# Patient Record
Sex: FEMALE | Race: WHITE | Employment: STUDENT | URBAN - METROPOLITAN AREA
[De-identification: names, ages, dates, MRNs, and addresses within clinical notes are randomized per-mention and may not be internally consistent; named-entity substitution may affect disease eponyms.]

---

## 2017-03-02 DIAGNOSIS — E05.00 GRAVES DISEASE: Primary | ICD-10-CM

## 2017-03-03 ENCOUNTER — TELEPHONE (OUTPATIENT)
Dept: ENDOCRINOLOGY | Age: 27
End: 2017-03-03

## 2017-03-03 NOTE — TELEPHONE ENCOUNTER
----- Message from Perley Klinefelter sent at 3/3/2017  8:52 AM EST -----  Spoke to patient this morning and scheduled her for 7/21/17 at 2:50 PM.   Thank you.        ----- Message -----     From: Uriel Strong MD     Sent: 3/2/2017  10:10 PM       To: Perley Klinefelter    Please call her to reschedule for the end of July for thyroid.

## 2017-04-11 LAB
T4 FREE SERPL-MCNC: 0.92 NG/DL (ref 0.82–1.77)
TSH RECEP AB SER-ACNC: 0.83 IU/L (ref 0–1.75)
TSH SERPL DL<=0.005 MIU/L-ACNC: 1.76 UIU/ML (ref 0.45–4.5)

## 2017-07-03 ENCOUNTER — OFFICE VISIT (OUTPATIENT)
Dept: FAMILY MEDICINE CLINIC | Age: 27
End: 2017-07-03

## 2017-07-03 VITALS
TEMPERATURE: 97.9 F | HEIGHT: 60 IN | RESPIRATION RATE: 22 BRPM | WEIGHT: 112.4 LBS | HEART RATE: 91 BPM | OXYGEN SATURATION: 98 % | SYSTOLIC BLOOD PRESSURE: 108 MMHG | BODY MASS INDEX: 22.07 KG/M2 | DIASTOLIC BLOOD PRESSURE: 80 MMHG

## 2017-07-03 DIAGNOSIS — I67.4 HYPERTENSIVE ENCEPHALOPATHY: ICD-10-CM

## 2017-07-03 DIAGNOSIS — N17.9 ACUTE RENAL FAILURE, UNSPECIFIED ACUTE RENAL FAILURE TYPE (HCC): ICD-10-CM

## 2017-07-03 LAB
BILIRUB UR QL STRIP: NEGATIVE
GLUCOSE UR-MCNC: NEGATIVE MG/DL
KETONES P FAST UR STRIP-MCNC: NEGATIVE MG/DL
PH UR STRIP: 7 [PH] (ref 4.6–8)
PROT UR QL STRIP: NEGATIVE MG/DL
SP GR UR STRIP: 1.02 (ref 1–1.03)
UA UROBILINOGEN AMB POC: NORMAL (ref 0.2–1)
URINALYSIS CLARITY POC: CLEAR
URINALYSIS COLOR POC: YELLOW
URINE BLOOD POC: NORMAL
URINE LEUKOCYTES POC: NORMAL
URINE NITRITES POC: NEGATIVE

## 2017-07-03 RX ORDER — NIFEDIPINE 30 MG/1
30 TABLET, FILM COATED, EXTENDED RELEASE ORAL DAILY
Qty: 30 TAB | Refills: 1 | Status: SHIPPED | OUTPATIENT
Start: 2017-07-03 | End: 2017-09-26

## 2017-07-03 RX ORDER — NIFEDIPINE 60 MG/1
TABLET, EXTENDED RELEASE ORAL
Refills: 0 | COMMUNITY
Start: 2017-06-27 | End: 2017-07-03 | Stop reason: DRUGHIGH

## 2017-07-03 NOTE — PROGRESS NOTES
HISTORY OF PRESENT ILLNESS  Taisha Dawn is a 23 y.o. female. f/u hospitalization for preeclampsia,taking nifedipine xl 60. Feeling well,no c/o blood pressure good on home testing  Hypertension    The history is provided by the patient. This is a chronic problem. The problem has not changed since onset. Pertinent negatives include no chest pain, no orthopnea, no palpitations, no malaise/fatigue, no headaches, no neck pain and no peripheral edema. Review of Systems   Constitutional: Negative for fever and malaise/fatigue. Cardiovascular: Negative for chest pain, palpitations and orthopnea. Musculoskeletal: Negative for back pain and neck pain. Neurological: Negative for headaches. Physical Exam   Constitutional: She appears well-developed and well-nourished. HENT:   Head: Normocephalic and atraumatic. Right Ear: External ear normal.   Left Ear: External ear normal.   Nose: Nose normal.   Mouth/Throat: Oropharynx is clear and moist.   Eyes: Conjunctivae are normal. Pupils are equal, round, and reactive to light. Neck: Normal range of motion. Neck supple. Cardiovascular: Normal rate and regular rhythm. Pulmonary/Chest: Effort normal and breath sounds normal.   Abdominal: Soft. Bowel sounds are normal.       ASSESSMENT and PLAN  Wes Galvan was seen today for hypertension. Diagnoses and all orders for this visit:    Postpartum hypertension,resolving. Will reduce nifedipine  -     NIFEdipine ER (ADALAT CC) 30 mg ER tablet; Take 1 Tab by mouth daily. Hypertensive encephalopathy  -     METABOLIC PANEL, COMPREHENSIVE  -     CBC WITH AUTOMATED DIFF  -     AMB POC URINALYSIS DIP STICK AUTO W/O MICRO    Acute renal failure, unspecified acute renal failure type (HCC),await labs  -     METABOLIC PANEL, COMPREHENSIVE  -     CBC WITH AUTOMATED DIFF  -     AMB POC URINALYSIS DIP STICK AUTO W/O MICRO      Follow-up Disposition:  Return in about 4 weeks (around 7/31/2017).

## 2017-07-03 NOTE — MR AVS SNAPSHOT
Visit Information Date & Time Provider Department Dept. Phone Encounter #  
 7/3/2017  3:30 PM [facility] Opitz[facility] [phone] [id number] Follow-up Instructions Return in about 4 weeks (around 7/31/2017). Your Appointments 7/21/2017  2:50 PM  
Follow Up with Mira Frausto MD  
[facility] [address] [facility]) Appt Note: f/u                  thyroid [employer] [address] [id number] [address] [facility] Upcoming Health Maintenance Date Due  
 HPV AGE 9Y-34Y (1 of 3 - Female 3 Dose Series) 9/3/2002 PAP AKA CERVICAL CYTOLOGY 9/3/2012 INFLUENZA AGE 9 TO ADULT 8/1/2017 DTaP/Tdap/Td series (2 - Td) 8/23/2026 Allergies as of 7/3/2017  Review Complete On: 7/3/2017 By: Anabell Strauss Severity Noted Reaction Type Reactions Pcn [Penicillins]  06/07/2014    Unknown (comments) Sulfa (Sulfonamide Antibiotics)  06/07/2014   Side Effect Rash A.s child Current Immunizations  Reviewed on 11/4/2016 Name Date Influenza Vaccine 12/1/2015 Influenza Vaccine (Quad) PF 11/3/2016 Not reviewed this visit You Were Diagnosed With   
  
 Codes Comments Postpartum hypertension    -  Primary ICD-10-CM: O16.5 ICD-9-CM: 642.94 Hypertensive encephalopathy     ICD-10-CM: I67.4 ICD-9-CM: 437.2 Acute renal failure, unspecified acute renal failure type (RUSTca 75.)     ICD-10-CM: N17.9 ICD-9-CM: 810. 9 Vitals BP Pulse Temp Resp Height(growth percentile) Weight(growth percentile) 108/80 (BP 1 Location: Right arm, BP Patient Position: Sitting) 91 97.9 °F (36.6 °C) (Oral) 22 5' (1.524 m) 112 lb 6.4 oz (51 kg) SpO2 BMI OB Status Smoking Status 98% 21.95 kg/m2 Pregnant Never Smoker Vitals History BMI and BSA Data Body Mass Index Body Surface Area 21.95 kg/m 2 1.47 m 2 Preferred Pharmacy Pharmacy Name Phone [facility] [address] [place] [address] [phone] Your Updated Medication List  
  
   
This list is accurate as of: 7/3/17  4:16 PM.  Always use your most recent med list.  
  
  
  
  
 aspirin 81 mg chewable tablet Take 81 mg by mouth daily. DAILY MULTIVITAMIN PO Take  by mouth daily. FIBER SUPPLEMENT PO Take  by mouth.  
  
 lidocaine-aloe vera 0.5 % topical gel Apply  to affected area three (3) times daily as needed. lidocaine-hydrocortisone 3-0.5 % rectal cream  
Apply  to affected area two (2) times a day. methIMAzole 10 mg tablet Commonly known as:  TAPAZOLE Take 0.5 Tabs by mouth three (3) days a week. On Mon, Wed, and Fri--Dose change 3/2/17--updated med list--did not send prescription to the pharmacy  
  
 mupirocin 2 % ointment Commonly known as:  [employer] Apply  to affected area two (2) times a day. NIFEdipine ER 30 mg ER tablet Commonly known as:  ADALAT CC Take 1 Tab by mouth daily. Prescriptions Sent to Pharmacy Refills NIFEdipine ER (ADALAT CC) 30 mg ER tablet 1 Sig: Take 1 Tab by mouth daily. Class: Normal  
 Pharmacy: [facility] [address] [place] [address] Ph #: [phone] Route: Oral  
  
We Performed the Following AMB POC URINALYSIS DIP STICK AUTO W/O MICRO [50479 CPT(R)] CBC WITH AUTOMATED DIFF [68709 CPT(R)] METABOLIC PANEL, COMPREHENSIVE [01962 CPT(R)] Follow-up Instructions Return in about 4 weeks (around 7/31/2017). Introducing [facility] & HEALTH SERVICES! Dear Renee Morillo: Thank you for requesting a [employer] account. Our records indicate that you already have an active [email or web address] account. You can access your account anytime at [email or web address] Did you know that you can access your hospital and ER discharge instructions at any time in [employer]? You can also review all of your test results from your hospital stay or ER visit. Additional Information If you have questions, please visit the Frequently Asked Questions section of the [employer] website at [email or web address] [email or web address]/. Remember, [employer] is NOT to be used for urgent needs. For medical emergencies, dial 911. Now available from your iPhone and Android! Please provide this summary of care documentation to your next provider. Your primary care clinician is listed as Nilo Webb. If you have any questions after today's visit, please call [phone].

## 2017-07-04 LAB
ALBUMIN SERPL-MCNC: 4.6 G/DL (ref 3.5–5.5)
ALBUMIN/GLOB SERPL: 1.8 {RATIO} (ref 1.2–2.2)
ALP SERPL-CCNC: 124 IU/L (ref 39–117)
ALT SERPL-CCNC: 25 IU/L (ref 0–32)
AST SERPL-CCNC: 18 IU/L (ref 0–40)
BASOPHILS # BLD AUTO: 0 X10E3/UL (ref 0–0.2)
BASOPHILS NFR BLD AUTO: 0 %
BILIRUB SERPL-MCNC: 0.3 MG/DL (ref 0–1.2)
BUN SERPL-MCNC: 15 MG/DL (ref 6–20)
BUN/CREAT SERPL: 21 (ref 9–23)
CALCIUM SERPL-MCNC: 9.8 MG/DL (ref 8.7–10.2)
CHLORIDE SERPL-SCNC: 103 MMOL/L (ref 96–106)
CO2 SERPL-SCNC: 24 MMOL/L (ref 18–29)
CREAT SERPL-MCNC: 0.71 MG/DL (ref 0.57–1)
EOSINOPHIL # BLD AUTO: 0.1 X10E3/UL (ref 0–0.4)
EOSINOPHIL NFR BLD AUTO: 2 %
ERYTHROCYTE [DISTWIDTH] IN BLOOD BY AUTOMATED COUNT: 14.2 % (ref 12.3–15.4)
GLOBULIN SER CALC-MCNC: 2.5 G/DL (ref 1.5–4.5)
GLUCOSE SERPL-MCNC: 76 MG/DL (ref 65–99)
HCT VFR BLD AUTO: 37.9 % (ref 34–46.6)
HGB BLD-MCNC: 11.9 G/DL (ref 11.1–15.9)
IMM GRANULOCYTES # BLD: 0 X10E3/UL (ref 0–0.1)
IMM GRANULOCYTES NFR BLD: 0 %
LYMPHOCYTES # BLD AUTO: 2.7 X10E3/UL (ref 0.7–3.1)
LYMPHOCYTES NFR BLD AUTO: 42 %
MCH RBC QN AUTO: 25.5 PG (ref 26.6–33)
MCHC RBC AUTO-ENTMCNC: 31.4 G/DL (ref 31.5–35.7)
MCV RBC AUTO: 81 FL (ref 79–97)
MONOCYTES # BLD AUTO: 0.4 X10E3/UL (ref 0.1–0.9)
MONOCYTES NFR BLD AUTO: 7 %
NEUTROPHILS # BLD AUTO: 3.1 X10E3/UL (ref 1.4–7)
NEUTROPHILS NFR BLD AUTO: 49 %
PLATELET # BLD AUTO: 542 X10E3/UL (ref 150–379)
POTASSIUM SERPL-SCNC: 4.6 MMOL/L (ref 3.5–5.2)
PROT SERPL-MCNC: 7.1 G/DL (ref 6–8.5)
RBC # BLD AUTO: 4.67 X10E6/UL (ref 3.77–5.28)
SODIUM SERPL-SCNC: 144 MMOL/L (ref 134–144)
WBC # BLD AUTO: 6.4 X10E3/UL (ref 3.4–10.8)

## 2017-07-10 DIAGNOSIS — E05.00 GRAVES DISEASE: ICD-10-CM

## 2017-09-23 LAB
T4 FREE SERPL-MCNC: 1.11 NG/DL (ref 0.82–1.77)
TSH RECEP AB SER-ACNC: <0.5 IU/L (ref 0–1.75)
TSH SERPL DL<=0.005 MIU/L-ACNC: 3.76 UIU/ML (ref 0.45–4.5)

## 2017-09-26 ENCOUNTER — OFFICE VISIT (OUTPATIENT)
Dept: ENDOCRINOLOGY | Age: 27
End: 2017-09-26

## 2017-09-26 VITALS
BODY MASS INDEX: 20.65 KG/M2 | SYSTOLIC BLOOD PRESSURE: 105 MMHG | HEART RATE: 86 BPM | DIASTOLIC BLOOD PRESSURE: 77 MMHG | WEIGHT: 105.2 LBS | HEIGHT: 60 IN

## 2017-09-26 DIAGNOSIS — E05.00 GRAVES DISEASE: Primary | ICD-10-CM

## 2017-09-26 RX ORDER — ACETAMINOPHEN AND CODEINE PHOSPHATE 120; 12 MG/5ML; MG/5ML
SOLUTION ORAL
Refills: 0 | COMMUNITY
Start: 2017-07-13 | End: 2018-03-27

## 2017-09-26 RX ORDER — METHIMAZOLE 10 MG/1
5 TABLET ORAL
COMMUNITY
Start: 2017-09-29 | End: 2018-03-27 | Stop reason: SDUPTHER

## 2017-09-26 NOTE — PROGRESS NOTES
Chief Complaint   Patient presents with    Thyroid Problem     pcp and pharmacy confirmed     History of Present Illness: Mary Carlson is a 33 y.o. female here for follow up of thyroid. Weight up 10 lbs since last visit in 10/16. Her son, Greg Tomlinson, was born 6/17/17, and her other son, Sandra Peterson, is now 21 months. Has been taking 1/2 tab of MMI 3x/week since 3/17 and hasn't missed any doses or run out. Currently bottle feeding. No chest pain aside from a rare pain when she breathes in deeply but this is not new. Bowels are somewhat constipated and uses a stool softener. No tremors. Has had some hair loss after delivery. Can get hot at night but not during the day. Currently on micronor and is on her 2nd pack and is only spotting at times. Current Outpatient Prescriptions   Medication Sig    norethindrone (MICRONOR) 0.35 mg tab     methIMAzole (TAPAZOLE) 10 mg tablet Take 0.5 Tabs by mouth three (3) days a week. On Mon, Wed, and Fri--Dose change 3/2/17--updated med list--did not send prescription to the pharmacy    PSYLLIUM SEED, WITH SUGAR, (FIBER SUPPLEMENT PO) Take  by mouth.  MV-MN/FA/VIT K/LYCOP/LUT/COQ10 (DAILY MULTIVITAMIN PO) Take  by mouth daily. No current facility-administered medications for this visit.       Allergies   Allergen Reactions    Pcn [Penicillins] Unknown (comments)    Sulfa (Sulfonamide Antibiotics) Rash     A.s child     Review of Systems:  - Cardiovascular: no chest pain  - Neurological: no tremors  - Integumentary: skin is normal    Physical Examination:  Blood pressure 105/77, pulse 86, height 5' (1.524 m), weight 105 lb 3.2 oz (47.7 kg), currently breastfeeding.  - General: pleasant, no distress, good eye contact   - Neck: small goiter  - Cardiovascular: regular, normal rate, nl s1 and s2, no m/r/g   - Respiratory: clear to auscultation bilaterally   - Integumentary: skin is normal, no edema  - Neurological: reflexes 2+ at biceps, no tremors  - Psychiatric: normal mood and affect    Data Reviewed:   Component      Latest Ref Rng & Units 9/22/2017 9/22/2017 9/22/2017          12:00 PM 12:00 PM 12:00 PM   T4, Free      0.82 - 1.77 ng/dL   1.11   TSH      0.450 - 4.500 uIU/mL  3.760    Thyrotropin Receptor Ab, serum      0.00 - 1.75 IU/L <0.50         Assessment/Plan:     1. Grave's disease: She has always been told she has normal thyroid function and delivered her son, Lamin Cuellar, on 12/21/15 and after delivery developed hypertension and needed metoprolol. Had her thyroid levels checked after delivery and TSH was normal but saw Dr. Amisha Rincon on 5/18/16 and TSH was low at 0.012 and T4 was high at 13.3. Repeat labs with me in 5/16 showed TSH 0.009, FT4 3.27 and T3 263 and TRAb of 1.97 and TPO ab of 43 and I started her on MMI 30 mg daily. TSH up to 0.013 and FT4/T3 normal in 6/16 so decreased to 20 mg daily and TSH up to 8.93 in 7/16 so decreased to 10 mg daily and TSH up to 33 in 8/16 so decreased to 5 mg daily. TSH normal at 0.61 and FT4 1.34 and T3 151 in 10/16 so kept her dose the same. Then found out she was pregnant and TSH 3.47 in 11/16 and changed to PTU 50 mg 1 tab in am and 1/2 tab in pm during 1st trimester and then changed back to MMI 10 mg 1/2 tab daily when she was in 2nd trimester. TSH 4.73 and TRAb 2.75 and TPO ab 29 in 1/17 and decreased to 1/2 tab 4x/week,  TSH 2.32 in 3/17 and decreased to 1/2 tab 3x/week and TSH 1.76 and TRAb 0.83 in 4/17 and kept her dose the same through delivery in 6/17. TSH 3.76 and TRAb < 0.50 in 9/17 so will decrease to 1/2 tab 2x/week for 3 months. - cont methimazole 1/2 of 10 mg daily  - check TSH and free T4 in 3 months and prior to next visit  - check TSH receptor antibody prior to next visit          Patient Instructions   1) Your TSH is a thyroid test.  Your level is 3.7 which is normal but slightly above goal of 0.5-2.0. This test goes opposite of your thyroid dose and suggests your dose of methimazole is more than you need.   I will decrease your dose to 1/2 tab 2x/week on Mon and Fri. I updated your med list but did not send a new prescription to your local pharmacy. 2) Your TSH receptor antibody, which is a test for Grave's disease, is now undetectable so you may go into remission in the future which would allow us to stop the methimazole. 3) Repeat your labs in 3 months and then again prior to your visit in 6 months. 4) I will send you a reminder e-mail in 3 months and 3-4 weeks prior to your next visit and you will have the order already in the [employer] system so you can just go sometime in the 3-7 days before the next visit to have your labs drawn. Follow-up Disposition:  Return in about 6 months (around 3/26/2018). Copy sent to:  Dr. Shyanne Gould via [facility]  Dr. Martha Freedman follow up: 12/24/17  Component      Latest Ref Rng & Units 12/21/2017 12/21/2017          11:52 AM 11:52 AM   T4, Free      0.82 - 1.77 ng/dL  1.06   TSH      0.450 - 4.500 uIU/mL 3.350      TSH is a thyroid test.  Your level is 3.35 which is normal but slightly above goal of 0.5-2.0. This test goes opposite of your thyroid dose and suggests your dose of methimazole is still working well and I would like you to decrease your dose to 1/2 tab once a week on Mondays only until you come back in March. If your level is still normal at that time and your antibody level remains undetectable, we will try stopping the methimazole at that time.

## 2017-09-26 NOTE — PATIENT INSTRUCTIONS
1) Your TSH is a thyroid test.  Your level is 3.7 which is normal but slightly above goal of 0.5-2.0. This test goes opposite of your thyroid dose and suggests your dose of methimazole is more than you need. I will decrease your dose to 1/2 tab 2x/week on Mon and Fri. I updated your med list but did not send a new prescription to your local pharmacy. 2) Your TSH receptor antibody, which is a test for Grave's disease, is now undetectable so you may go into remission in the future which would allow us to stop the methimazole. 3) Repeat your labs in 3 months and then again prior to your visit in 6 months. 4) I will send you a reminder e-mail in 3 months and 3-4 weeks prior to your next visit and you will have the order already in the [employer] system so you can just go sometime in the 3-7 days before the next visit to have your labs drawn.

## 2017-09-26 NOTE — MR AVS SNAPSHOT
Visit Information Date & Time Provider Department Dept. Phone Encounter #  
 9/26/2017  9:30 AM Alisha Killian, [address] [place] [address] [facility] [phone] [id number] Follow-up Instructions Return in about 6 months (around 3/26/2018). Upcoming Health Maintenance Date Due  
 HPV AGE 9Y-34Y (1 of 3 - Female 3 Dose Series) 9/3/2002 Pneumococcal 19-64 Highest Risk (1 of 3 - PCV13) 9/3/2010 PAP AKA CERVICAL CYTOLOGY 9/3/2012 INFLUENZA AGE 9 TO ADULT 8/1/2017 DTaP/Tdap/Td series (2 - Td) 8/23/2026 Allergies as of 9/26/2017  Review Complete On: 9/26/2017 By: Alisha Killian MD  
  
 Severity Noted Reaction Type Reactions Pcn [Penicillins]  06/07/2014    Unknown (comments) Sulfa (Sulfonamide Antibiotics)  06/07/2014   Side Effect Rash A.s child Current Immunizations  Reviewed on 11/4/2016 Name Date Influenza Vaccine 12/1/2015 Influenza Vaccine (Quad) PF 11/3/2016 Not reviewed this visit You Were Diagnosed With   
  
 Codes Comments Graves disease    -  Primary ICD-10-CM: E05.00 ICD-9-CM: 242.00 Vitals BP Pulse Height(growth percentile) Weight(growth percentile) BMI OB Status 105/77 86 5' (1.524 m) 105 lb 3.2 oz (47.7 kg) 20.55 kg/m2 Pregnant Smoking Status Never Smoker Vitals History BMI and BSA Data Body Mass Index Body Surface Area 20.55 kg/m 2 1.42 m 2 Preferred Pharmacy Pharmacy Name Phone [facility] [address] [address] [employer] [facility] [phone] Your Updated Medication List  
  
   
This list is accurate as of: 9/26/17 10:20 AM.  Always use your most recent med list.  
  
  
  
  
 DAILY MULTIVITAMIN PO Take  by mouth daily. FIBER SUPPLEMENT PO Take  by mouth. methIMAzole 10 mg tablet Commonly known as:  TAPAZOLE  
 Take 0.5 Tabs by mouth two (2) times a week. On Mon and Fri--Dose change 9/26/17--updated med list--did not send prescription to the pharmacy Start taking on:  9/29/2017  
  
 norethindrone 0.35 mg Tab Commonly known as:  [employer] Regulo Follow-up Instructions Return in about 6 months (around 3/26/2018). Patient Instructions 1) Your TSH is a thyroid test.  Your level is 3.7 which is normal but slightly above goal of 0.5-2.0. This test goes opposite of your thyroid dose and suggests your dose of methimazole is more than you need. I will decrease your dose to 1/2 tab 2x/week on Mon and Fri. I updated your med list but did not send a new prescription to your local pharmacy. 2) Your TSH receptor antibody, which is a test for Grave's disease, is now undetectable so you may go into remission in the future which would allow us to stop the methimazole. 3) Repeat your labs in 3 months and then again prior to your visit in 6 months. 4) I will send you a reminder e-mail in 3 months and 3-4 weeks prior to your next visit and you will have the order already in the [facility] system so you can just go sometime in the 3-7 days before the next visit to have your labs drawn. Introducing [facility] & HEALTH SERVICES! Dear Dianne Hinton: Thank you for requesting a [employer] account. Our records indicate that you already have an active [employer] account. You can access your account anytime at [email or web address] Did you know that you can access your hospital and ER discharge instructions at any time in [employer]? You can also review all of your test results from your hospital stay or ER visit. Additional Information If you have questions, please visit the Frequently Asked Questions section of the [employer] website at [email or web address]/. Remember, [employer] is NOT to be used for urgent needs. For medical emergencies, dial 911. Now available from your iPhone and Android! Please provide this summary of care documentation to your next provider. Your primary care clinician is listed as Nilo Webb. If you have any questions after today's visit, please call [phone].

## 2017-12-20 DIAGNOSIS — E05.00 GRAVES DISEASE: ICD-10-CM

## 2017-12-22 LAB
T4 FREE SERPL-MCNC: 1.06 NG/DL (ref 0.82–1.77)
TSH SERPL DL<=0.005 MIU/L-ACNC: 3.35 UIU/ML (ref 0.45–4.5)

## 2017-12-24 DIAGNOSIS — E05.00 GRAVES DISEASE: ICD-10-CM

## 2018-03-24 LAB
T4 FREE SERPL-MCNC: 1.16 NG/DL (ref 0.82–1.77)
TSH RECEP AB SER-ACNC: 1.99 IU/L (ref 0–1.75)
TSH SERPL DL<=0.005 MIU/L-ACNC: 0.44 UIU/ML (ref 0.45–4.5)

## 2018-03-27 ENCOUNTER — OFFICE VISIT (OUTPATIENT)
Dept: ENDOCRINOLOGY | Age: 28
End: 2018-03-27

## 2018-03-27 VITALS
DIASTOLIC BLOOD PRESSURE: 65 MMHG | BODY MASS INDEX: 20.14 KG/M2 | HEART RATE: 75 BPM | HEIGHT: 60 IN | SYSTOLIC BLOOD PRESSURE: 118 MMHG | WEIGHT: 102.6 LBS

## 2018-03-27 DIAGNOSIS — E05.00 GRAVES DISEASE: Primary | ICD-10-CM

## 2018-03-27 RX ORDER — METHIMAZOLE 10 MG/1
TABLET ORAL
COMMUNITY
Start: 2018-03-27 | End: 2018-06-21 | Stop reason: SDUPTHER

## 2018-03-27 NOTE — MR AVS SNAPSHOT
[address] [facility] [address] [address] [phone] Patient: Gena Brown MRN:  [id number] Visit Information Date & Time Provider Department Dept. Phone Encounter #  
 3/27/2018  9:50 AM Lolly Mak MD [facility] [phone] [id number] Follow-up Instructions Return in about 6 months (around 9/27/2018). Upcoming Health Maintenance Date Due  
 HPV AGE 9Y-34Y (1 of 3 - Female 3 Dose Series) 9/3/2002 Pneumococcal 19-64 Highest Risk (1 of 3 - PCV13) 9/3/2010 PAP AKA CERVICAL CYTOLOGY 9/3/2012 Influenza Age 5 to Adult 8/1/2017 DTaP/Tdap/Td series (2 - Td) 8/23/2026 Allergies as of 3/27/2018  Review Complete On: 3/27/2018 By: Lolly Mak MD  
  
 Severity Noted Reaction Type Reactions Pcn [Penicillins]  06/07/2014    Unknown (comments) Sulfa (Sulfonamide Antibiotics)  06/07/2014   Side Effect Rash A.s child Current Immunizations  Reviewed on 11/4/2016 Name Date Influenza Vaccine 12/1/2015 Influenza Vaccine (Quad) PF 11/3/2016 Not reviewed this visit You Were Diagnosed With   
  
 Codes Comments Graves disease    -  Primary ICD-10-CM: E05.00 ICD-9-CM: 242.00 Vitals BP Pulse Height(growth percentile) Weight(growth percentile) BMI OB Status 118/65 75 5' (1.524 m) 102 lb 9.6 oz (46.5 kg) 20.04 kg/m2 Pregnant Smoking Status Never Smoker Vitals History BMI and BSA Data Body Mass Index Body Surface Area 20.04 kg/m 2 1.4 m 2 Preferred Pharmacy Pharmacy Name Phone [facility] [address] [place] [address], [facility] [address] [phone] Your Updated Medication List  
  
   
This list is accurate as of 3/27/18 10:27 AM.  Always use your most recent med list.  
  
  
  
  
 FIBER SUPPLEMENT PO Take  by mouth. levonorgestrel 20 mcg/24 hr (5 years) Iud  
Commonly known as:  MIRENA  
1 Device by IntraUTERine route once. Inserted in 12/17  
  
 methIMAzole 10 mg tablet Commonly known as:  TAPAZOLE Take 1/2 tab on Mon, Wed, and Fri--Dose change 3/27/18--updated med list--did not send prescription to the pharmacy PROBIOTIC PO Take  by mouth. Follow-up Instructions Return in about 6 months (around 9/27/2018). To-Do List   
 06/27/2018 Lab:  T4, FREE   
  
 06/27/2018 Lab:  TSH 3RD GENERATION   
  
 09/10/2018 Lab:  T4, FREE   
  
 09/10/2018 Lab:  TSH 3RD GENERATION   
  
 09/10/2018 Lab:  TSH RECEPTOR AB Patient Instructions 1) TSH is a thyroid test.  Your level is 0.43 which is slightly low and below goal of 0.5-2.0. This test goes opposite of your thyroid dose and suggests your dose of methimazole is not enough and your TSH receptor antibody, which is a test for Grave's disease is higher than in 9/17. I will increase your dose to 1/2 tab 3x/week. I updated your med list but did not send a new prescription to your local pharmacy. 2) Plan on repeating your labs in 3 months and in 6 months and I sent you reminders for both of these. Introducing [facility] & HEALTH SERVICES! Dear Manisha Marlow: Thank you for requesting a [facility] account. Our records indicate that you already have an active [employer] account. You can access your account anytime at [email or web address] Did you know that you can access your hospital and ER discharge instructions at any time in [employer]? You can also review all of your test results from your hospital stay or ER visit. Additional Information If you have questions, please visit the Frequently Asked Questions section of the [employer] website at [email or web address] Remember, [facility] [facility] [employer] is NOT to be used for urgent needs. For medical emergencies, dial 911. Now available from your iPhone and Android! Please provide this summary of care documentation to your next provider. Your primary care clinician is listed as Darin Carolina. If you have any questions after today's visit, please call [phone].

## 2018-03-27 NOTE — PROGRESS NOTES
Chief Complaint   Patient presents with    Thyroid Problem     pcp and pharmacy confirmed     History of Present Illness: Freddy Mcgowan is a 33 y.o. female here for follow up of thyroid. Weight down 3 lbs since last visit in 9/17. Has been taking 1/2 tab of MMI every Monday and if she misses will take on Tuesday and hasn't missed any weeks at all. Has started feeling hotter more recently over the past 3 weeks. Her hair loss has slowed. No chest pain. Some palpitations that are unchanged. No tremors. Bowels tend to be on the constipated side. Some anxiety and trouble sleeping that has increased. Her one son is now 6 months and the other is 3years old. Does have some dizziness. Did change from OCP to IUD in 12/17. Current Outpatient Prescriptions   Medication Sig    LACTOBACILLUS ACIDOPHILUS (PROBIOTIC PO) Take  by mouth.  methIMAzole (TAPAZOLE) 10 mg tablet Take 1/2 tab weekly    levonorgestrel (MIRENA) 20 mcg/24 hr (5 years) IUD 1 Device by IntraUTERine route once. Inserted in 12/17    PSYLLIUM SEED, WITH SUGAR, (FIBER SUPPLEMENT PO) Take  by mouth. No current facility-administered medications for this visit.       Allergies   Allergen Reactions    Pcn [Penicillins] Unknown (comments)    Sulfa (Sulfonamide Antibiotics) Rash     A.s child     Review of Systems:  - Cardiovascular: no chest pain  - Neurological: no tremors  - Integumentary: skin is normal    Physical Examination:  Blood pressure 118/65, pulse 75, height 5' (1.524 m), weight 102 lb 9.6 oz (46.5 kg), currently breastfeeding.  - General: pleasant, no distress, good eye contact   - Neck: small goiter, no thyroid bruits  - Cardiovascular: regular, normal rate, nl s1 and s2, no m/r/g   - Respiratory: clear to auscultation bilaterally   - Integumentary: skin is normal, no edema  - Neurological: reflexes 2+ at biceps, mild tremor  - Psychiatric: normal mood and affect    Data Reviewed:   Component      Latest Ref Rng & Units 3/23/2018 3/23/2018 3/23/2018          12:07 PM 12:07 PM 12:07 PM   Thyrotropin Receptor Ab, serum      0.00 - 1.75 IU/L   1.99 (H)   T4, Free      0.82 - 1.77 ng/dL  1.16    TSH      0.450 - 4.500 uIU/mL 0.439 (L)         Assessment/Plan:     1. Grave's disease: She has always been told she has normal thyroid function and delivered her son, Belgica Louise, on 12/21/15 and after delivery developed hypertension and needed metoprolol. Had her thyroid levels checked after delivery and TSH was normal but saw Dr. Joann Heredia on 5/18/16 and TSH was low at 0.012 and T4 was high at 13.3. Repeat labs with me in 5/16 showed TSH 0.009, FT4 3.27 and T3 263 and TRAb of 1.97 and TPO ab of 43 and I started her on MMI 30 mg daily. TSH up to 0.013 and FT4/T3 normal in 6/16 so decreased to 20 mg daily and TSH up to 8.93 in 7/16 so decreased to 10 mg daily and TSH up to 33 in 8/16 so decreased to 5 mg daily. TSH normal at 0.61 and FT4 1.34 and T3 151 in 10/16 so kept her dose the same. Then found out she was pregnant and TSH 3.47 in 11/16 and changed to PTU 50 mg 1 tab in am and 1/2 tab in pm during 1st trimester and then changed back to MMI 10 mg 1/2 tab daily when she was in 2nd trimester. TSH 4.73 and TRAb 2.75 and TPO ab 29 in 1/17 and decreased to 1/2 tab 4x/week,  TSH 2.32 in 3/17 and decreased to 1/2 tab 3x/week and TSH 1.76 and TRAb 0.83 in 4/17 and kept her dose the same through delivery in 6/17. TSH 3.76 and TRAb < 0.50 in 9/17 so decreased to 1/2 tab 2x/week for 3 months and TSH 3.35 in 12/17 so decreased to 1/2 tab once a week and TSH down to 0.439 and TRAb up to 1.99 in 3/18 so will go back to 1/2 tab 3x/week  - increase methimazole to 1/2 of 10 mg 3x/week  - check TSH and free T4 in 3 months and prior to next visit  - check TSH receptor antibody prior to next visit          Patient Instructions   1) TSH is a thyroid test.  Your level is 0.43 which is slightly low and below goal of 0.5-2.0.   This test goes opposite of your thyroid dose and suggests your dose of methimazole is not enough and your TSH receptor antibody, which is a test for Grave's disease is higher than in 9/17. I will increase your dose to 1/2 tab 3x/week. I updated your med list but did not send a new prescription to your local pharmacy. 2) Plan on repeating your labs in 3 months and in 6 months and I sent you reminders for both of these. Follow-up Disposition:  Return in about 6 months (around 9/27/2018). Copy sent to:  Dr. Pierre Lloyd via [facility]  Dr. Argenis Gary    Lab follow up: 6/21/18  - TSH 3.88    Sent her the following message through [employer]:  TSH is a thyroid test.  Your level is 3.88 which is normal but above goal of 0.5-2.0. This test goes opposite of your thyroid dose and suggests your dose of methimazole is now more than you need. I will decrease your dose back to 1/2 of the 10 mg tab 2x/week on Mon and Fri until you come back to see me in September.

## 2018-03-27 NOTE — PATIENT INSTRUCTIONS
1) TSH is a thyroid test.  Your level is 0.43 which is slightly low and below goal of 0.5-2.0. This test goes opposite of your thyroid dose and suggests your dose of methimazole is not enough and your TSH receptor antibody, which is a test for Grave's disease is higher than in 9/17. I will increase your dose to 1/2 tab 3x/week. I updated your med list but did not send a new prescription to your local pharmacy. 2) Plan on repeating your labs in 3 months and in 6 months and I sent you reminders for both of these.

## 2018-06-21 RX ORDER — METHIMAZOLE 10 MG/1
TABLET ORAL
COMMUNITY
Start: 2018-06-21 | End: 2018-09-18 | Stop reason: SDUPTHER

## 2018-07-11 ENCOUNTER — OFFICE VISIT (OUTPATIENT)
Dept: FAMILY MEDICINE CLINIC | Age: 28
End: 2018-07-11

## 2018-07-11 VITALS
TEMPERATURE: 99.3 F | OXYGEN SATURATION: 100 % | DIASTOLIC BLOOD PRESSURE: 68 MMHG | SYSTOLIC BLOOD PRESSURE: 110 MMHG | HEIGHT: 60 IN | BODY MASS INDEX: 19.83 KG/M2 | RESPIRATION RATE: 24 BRPM | HEART RATE: 119 BPM | WEIGHT: 101 LBS

## 2018-07-11 DIAGNOSIS — J02.9 PHARYNGITIS, UNSPECIFIED ETIOLOGY: Primary | ICD-10-CM

## 2018-07-11 DIAGNOSIS — R50.9 FEVER AND CHILLS: ICD-10-CM

## 2018-07-11 DIAGNOSIS — J32.0 MAXILLARY SINUSITIS, UNSPECIFIED CHRONICITY: Primary | ICD-10-CM

## 2018-07-11 DIAGNOSIS — F41.1 GENERALIZED ANXIETY DISORDER: ICD-10-CM

## 2018-07-11 LAB
BILIRUB UR QL STRIP: NEGATIVE
GLUCOSE UR-MCNC: NEGATIVE MG/DL
KETONES P FAST UR STRIP-MCNC: NORMAL MG/DL
PH UR STRIP: 7 [PH] (ref 4.6–8)
PROT UR QL STRIP: NORMAL
QUICKVUE INFLUENZA TEST: NEGATIVE
S PYO AG THROAT QL: NEGATIVE
SP GR UR STRIP: 1.02 (ref 1–1.03)
UA UROBILINOGEN AMB POC: NORMAL (ref 0.2–1)
URINALYSIS CLARITY POC: CLEAR
URINALYSIS COLOR POC: YELLOW
URINE BLOOD POC: NEGATIVE
URINE LEUKOCYTES POC: NEGATIVE
URINE NITRITES POC: NEGATIVE
VALID INTERNAL CONTROL?: YES
VALID INTERNAL CONTROL?: YES

## 2018-07-11 RX ORDER — ALPRAZOLAM 0.25 MG/1
0.25 TABLET ORAL
Qty: 30 TAB | Refills: 2 | Status: SHIPPED | OUTPATIENT
Start: 2018-07-11 | End: 2018-09-24

## 2018-07-11 RX ORDER — AZITHROMYCIN 250 MG/1
TABLET, FILM COATED ORAL
Qty: 6 TAB | Refills: 0 | Status: SHIPPED | OUTPATIENT
Start: 2018-07-11 | End: 2018-09-24

## 2018-07-11 RX ORDER — ESCITALOPRAM OXALATE 5 MG/1
5 TABLET ORAL DAILY
Qty: 30 TAB | Refills: 1 | Status: SHIPPED | OUTPATIENT
Start: 2018-07-11 | End: 2018-09-24

## 2018-07-11 NOTE — PROGRESS NOTES
Chief Complaint   Patient presents with    Muscle Pain     Pt state she is having muscle pains.  Fever     Pt has low grade fever.

## 2018-07-11 NOTE — MR AVS SNAPSHOT
[place] 
 
 
 [address] [facility] [address] [address] 
[phone] Patient: Phan Dumont MRN: [id number] Visit Information Date & Time Provider Department Dept. Phone Encounter #  
 7/11/2018  4:15 PM Tung Hopkins Shyam Corcoran [phone] [id number] Follow-up Instructions Return if symptoms worsen or fail to improve. Your Appointments 9/24/2018 10:30 AM  
Follow Up with MD Jamar Hussein Diabetes and Endocrinology [facility]) Appt Note: f/u Thyroid [address] [phone] [address] [facility] Upcoming Health Maintenance Date Due  
 HPV Age 9Y-34Y (3 of 3 - Female 3 Dose Series) 9/3/2002 PAP AKA CERVICAL CYTOLOGY 9/3/2012 Influenza Age 5 to Adult 8/1/2018 DTaP/Tdap/Td series (2 - Td) 8/23/2026 Allergies as of 7/11/2018  Review Complete On: 7/11/2018 By: Tung Hopkins MD  
  
 Severity Noted Reaction Type Reactions Pcn [Penicillins]  06/07/2014    Unknown (comments) Sulfa (Sulfonamide Antibiotics)  06/07/2014   Side Effect Rash A.s child Current Immunizations  Reviewed on 11/4/2016 Name Date Influenza Vaccine 12/1/2015 Influenza Vaccine (Quad) PF 11/3/2016 Not reviewed this visit You Were Diagnosed With   
  
 Codes Comments Pharyngitis, unspecified etiology    -  Primary ICD-10-CM: J02.9 ICD-9-CM: 435 Fever and chills     ICD-10-CM: R50.9 ICD-9-CM: 780.60 Generalized anxiety disorder     ICD-10-CM: F41.1 ICD-9-CM: 300.02 Vitals BP Pulse Temp Resp Height(growth percentile) Weight(growth percentile) 110/68 (BP 1 Location: Left arm, BP Patient Position: Sitting) (!) 119 99.3 °F (37.4 °C) (Oral) 24 5' (1.524 m) 101 lb (45.8 kg) SpO2 BMI OB Status Smoking Status 100% 19.73 kg/m2 Pregnant Never Smoker Vitals History BMI and BSA Data Body Mass Index Body Surface Area  
 19.73 kg/m 2 1.39 m 2 Preferred Pharmacy Pharmacy Name Phone [facility] [address], [address] [place] [address] Drive [phone] Your Updated Medication List  
  
   
This list is accurate as of 7/11/18  5:02 PM.  Always use your most recent med list.  
  
  
  
  
 ALPRAZolam 0.25 mg tablet Commonly known as:  Rey [place] Take 1 Tab by mouth nightly as needed for Anxiety. Max Daily Amount: 0.25 mg.  
  
 azithromycin 250 mg tablet Commonly known as:  Toshia Brain Take 2 tablets today, then take 1 tablet daily  
  
 escitalopram oxalate 5 mg tablet Commonly known as:  Abelardo Leriche Take 1 Tab by mouth daily. FIBER SUPPLEMENT PO Take  by mouth.  
  
 levonorgestrel 20 mcg/24 hr (5 years) Iud  
Commonly known as:  MIRENA  
1 Device by IntraUTERine route once. Inserted in 12/17  
  
 methIMAzole 10 mg tablet Commonly known as:  TAPAZOLE Take 1/2 tab on Mon, and Fri--Dose change 6/21/18--updated med list--did not send prescription to the pharmacy PROBIOTIC PO Take  by mouth. Prescriptions Printed Refills  
 escitalopram oxalate (LEXAPRO) 5 mg tablet 1 Sig: Take 1 Tab by mouth daily. Class: Print Route: Oral  
 ALPRAZolam (XANAX) 0.25 mg tablet 2 Sig: Take 1 Tab by mouth nightly as needed for Anxiety. Max Daily Amount: 0.25 mg.  
 Class: Print Route: Oral  
  
We Performed the Following AMB POC COMPLETE CBC, AUTOMATED [11430 CPT(R)] AMB POC RAPID INFLUENZA TEST [02232 CPT(R)] AMB POC RAPID STREP A [23585 CPT(R)] AMB POC URINALYSIS DIP STICK AUTO W/O MICRO [67672 CPT(R)] Follow-up Instructions Return if symptoms worsen or fail to improve. Introducing [address]! Dear Kait Mckenzie: Thank you for requesting a [employer] account. Our records indicate that you already have an active [employer] account. You can access your account anytime at [email or web address] Did you know that you can access your hospital and ER discharge instructions at any time in [employer]? You can also review all of your test results from your hospital stay or ER visit. Additional Information If you have questions, please visit the Frequently Asked Questions section of the [employer] website at [email or web address] Remember, [employer] is NOT to be used for urgent needs. For medical emergencies, dial 911. Now available from your iPhone and Android! Please provide this summary of care documentation to your next provider. Your primary care clinician is listed as Zoran Dupont. If you have any questions after today's visit, please call [phone].

## 2018-07-11 NOTE — PROGRESS NOTES
HISTORY OF PRESENT ILLNESS  Talita Cuenca is a 33 y.o. female. 2 days sore throat,fever and chills ,myalgias. No cough ,dysuria,dirrhea or sick contacts  Muscle Pain    The history is provided by the patient. This is a new problem. The problem occurs daily. The quality of the pain is described as aching. The pain is at a severity of 4/10. The pain is moderate. Associated symptoms include back pain and neck pain. Fever    This is a chronic problem. The problem occurs daily. The problem has not changed since onset. Associated symptoms include headaches, sore throat, muscle aches and neck pain. Pertinent negatives include no chest pain, no diarrhea, no congestion, no cough, no mental status change and no rash. Anxiety   This is a chronic problem. The problem occurs daily. The problem has not changed since onset. Associated symptoms include headaches. Pertinent negatives include no chest pain. Review of Systems   Constitutional: Positive for fever. HENT: Positive for sore throat. Negative for congestion and hearing loss. Respiratory: Negative for cough. Cardiovascular: Negative for chest pain. Gastrointestinal: Negative for diarrhea. Musculoskeletal: Positive for back pain, myalgias and neck pain. Skin: Negative for rash. Neurological: Positive for headaches. Physical Exam   Constitutional: She is oriented to person, place, and time. She appears well-developed and well-nourished. HENT:   Head: Normocephalic and atraumatic. Right Ear: Tympanic membrane and ear canal normal.   Left Ear: Tympanic membrane and ear canal normal.   Nose: Mucosal edema and rhinorrhea present. Mouth/Throat: Posterior oropharyngeal erythema present. Eyes: Conjunctivae are normal. Pupils are equal, round, and reactive to light. Neck: Normal range of motion. Neck supple. Abdominal: Soft. Bowel sounds are normal.   Neurological: She is alert and oriented to person, place, and time. Skin: Skin is warm. Psychiatric: She has a normal mood and affect. ASSESSMENT and PLAN  Diagnoses and all orders for this visit:    1. Pharyngitis, unspecified etiology  -     AMB POC RAPID STREP A    2. Fever and chills  -     AMB POC URINALYSIS DIP STICK AUTO W/O MICRO  -     AMB POC COMPLETE CBC, AUTOMATED  -     AMB POC RAPID INFLUENZA TEST    3. Generalized anxiety disorder  -     escitalopram oxalate (LEXAPRO) 5 mg tablet; Take 1 Tab by mouth daily.  -     ALPRAZolam (XANAX) 0.25 mg tablet; Take 1 Tab by mouth nightly as needed for Anxiety. Max Daily Amount: 0.25 mg. Follow-up Disposition:  Return if symptoms worsen or fail to improve.

## 2018-09-18 LAB
T4 FREE SERPL-MCNC: 1.04 NG/DL (ref 0.82–1.77)
TSH SERPL DL<=0.005 MIU/L-ACNC: 5.88 UIU/ML (ref 0.45–4.5)

## 2018-09-18 RX ORDER — METHIMAZOLE 10 MG/1
TABLET ORAL
COMMUNITY
Start: 2018-09-18 | End: 2019-03-25

## 2018-09-24 ENCOUNTER — OFFICE VISIT (OUTPATIENT)
Dept: ENDOCRINOLOGY | Age: 28
End: 2018-09-24

## 2018-09-24 VITALS
SYSTOLIC BLOOD PRESSURE: 110 MMHG | DIASTOLIC BLOOD PRESSURE: 73 MMHG | HEART RATE: 83 BPM | BODY MASS INDEX: 19.99 KG/M2 | HEIGHT: 60 IN | WEIGHT: 101.8 LBS

## 2018-09-24 DIAGNOSIS — E05.00 GRAVES DISEASE: Primary | ICD-10-CM

## 2018-09-24 NOTE — MR AVS SNAPSHOT
[address] [facility] [address] [address] [phone] Patient: Kenya Iraheta MRN:  [id number] Visit Information Date & Time Provider Department Dept. Phone Encounter #  
 9/24/2018 10:30 AM Janusz Alba, [address] [place] [address] Diabetes and Endocrinology [phone] [id number] Follow-up Instructions Return in about 6 months (around 3/24/2019). Upcoming Health Maintenance Date Due  
 PAP AKA CERVICAL CYTOLOGY 9/3/2012 Influenza Age 5 to Adult 8/1/2018 DTaP/Tdap/Td series (2 - Td) 8/23/2026 Allergies as of 9/24/2018  Review Complete On: 9/24/2018 By: Janusz Alba MD  
  
 Severity Noted Reaction Type Reactions Pcn [Penicillins]  06/07/2014    Unknown (comments) Sulfa (Sulfonamide Antibiotics)  06/07/2014   Side Effect Rash A.s child Current Immunizations  Reviewed on 11/4/2016 Name Date Influenza Vaccine 12/1/2015 Influenza Vaccine (Quad) PF 11/3/2016 Not reviewed this visit You Were Diagnosed With   
  
 Codes Comments Graves disease    -  Primary ICD-10-CM: E05.00 ICD-9-CM: 242.00 Vitals BP Pulse Height(growth percentile) Weight(growth percentile) BMI OB Status 110/73 83 5' (1.524 m) 101 lb 12.8 oz (46.2 kg) 19.88 kg/m2 Pregnant Smoking Status Never Smoker Vitals History BMI and BSA Data Body Mass Index Body Surface Area  
 19.88 kg/m 2 1.4 m 2 Preferred Pharmacy Pharmacy Name Phone [facility] [address] [place] [address] [facility] [phone] Your Updated Medication List  
  
   
This list is accurate as of 9/24/18 10:49 AM.  Always use your most recent med list.  
  
  
  
  
 FIBER SUPPLEMENT PO Take  by mouth. KYLEENA 17.5 mcg/24 hr (5 years) Iud IUD Generic drug:  levonorgestrel 1 Each by IntraUTERine route once. methIMAzole 10 mg tablet Commonly known as:  TAPAZOLE Take 1/2 tab on Mon only--Dose change 9/18/18--updated med list--did not send prescription to the pharmacy PROBIOTIC PO Take  by mouth. Follow-up Instructions Return in about 6 months (around 3/24/2019). To-Do List   
 12/24/2018 Lab:  T4, FREE   
  
 12/24/2018 Lab:  TSH 3RD GENERATION   
  
 12/24/2018 Lab:  TSH RECEPTOR AB   
  
 03/14/2019 Lab:  T4, FREE   
  
 03/14/2019 Lab:  TSH 3RD GENERATION   
  
 03/14/2019 Lab:  TSH RECEPTOR AB Patient Instructions 1) Stay on methimazole 1/2 of 10 mg tab once a week for the next 3 months and then we'll repeat your labs Introducing [facility]! Dear Ariana Rooney: Thank you for requesting a [employer] account. Our records indicate that you already have an active [employer] account. You can access your account anytime at [email or web address] Did you know that you can access your hospital and ER discharge instructions at any time in [employer]? You can also review all of your test results from your hospital stay or ER visit. Additional Information If you have questions, please visit the Frequently Asked Questions section of the [employer] website at [email or web address] Remember, [employer] is NOT to be used for urgent needs. For medical emergencies, dial 911. Now available from your iPhone and Android! Please provide this summary of care documentation to your next provider. Your primary care clinician is listed as Kanu Sewell. If you have any questions after today's visit, please call [phone].

## 2018-09-24 NOTE — PROGRESS NOTES
Chief Complaint   Patient presents with    Thyroid Problem     pcp and pharmacy confirmed     History of Present Illness: [place] is a 33 y.o. female here for follow up of thyroid. Weight down 1 lbs since last visit in 3/18. Had been taking 1/2 tab 2x/week of 10 mg of MMI until I e-mailed her last week to decrease to 1/2 tab once a week. No chest pain. Rare palpitations. Has felt hotter more and waking up at night sweating. Still doesn't sleep well. Bowels are regular. Has had a lot of hair loss. No tremors. Current Outpatient Prescriptions   Medication Sig    levonorgestrel (KYLEENA) 17.5 mcg/24 hr (5 years) IUD IUD 1 Each by IntraUTERine route once.  methIMAzole (TAPAZOLE) 10 mg tablet Take 1/2 tab on Mon only--Dose change 9/18/18--updated med list--did not send prescription to the pharmacy    LACTOBACILLUS ACIDOPHILUS (PROBIOTIC PO) Take  by mouth.  PSYLLIUM SEED, WITH SUGAR, (FIBER SUPPLEMENT PO) Take  by mouth. No current facility-administered medications for this visit.       Allergies   Allergen Reactions    Pcn [Penicillins] Unknown (comments)    Sulfa (Sulfonamide Antibiotics) Rash     A.s child     Review of Systems:  - Cardiovascular: no chest pain  - Neurological: no tremors  - Integumentary: skin is normal    Physical Examination:  Blood pressure 110/73, pulse 83, height 5' (1.524 m), weight 101 lb 12.8 oz (46.2 kg), currently breastfeeding.  - General: pleasant, no distress, good eye contact   - Neck: small goiter, no thyroid bruits  - Cardiovascular: regular, normal rate, nl s1 and s2, no m/r/g   - Respiratory: clear to auscultation bilaterally   - Integumentary: skin is normal, no edema  - Neurological: reflexes 2+ at biceps, no tremors  - Psychiatric: normal mood and affect    Data Reviewed:   Component      Latest Ref Rng & Units 9/17/2018 9/17/2018          11:55 AM 11:55 AM   T4, Free      0.82 - 1.77 ng/dL  1.04   TSH      0.450 - 4.500 uIU/mL 5.880 (H) Assessment/Plan:     1. Grave's disease: She has always been told she has normal thyroid function and delivered her son, Lizette Hess, on 12/21/15 and after delivery developed hypertension and needed metoprolol. Had her thyroid levels checked after delivery and TSH was normal but saw Dr. Kenton Eduardo on 5/18/16 and TSH was low at 0.012 and T4 was high at 13.3. Repeat labs with me in 5/16 showed TSH 0.009, FT4 3.27 and T3 263 and TRAb of 1.97 and TPO ab of 43 and I started her on MMI 30 mg daily. TSH up to 0.013 and FT4/T3 normal in 6/16 so decreased to 20 mg daily and TSH up to 8.93 in 7/16 so decreased to 10 mg daily and TSH up to 33 in 8/16 so decreased to 5 mg daily. TSH normal at 0.61 and FT4 1.34 and T3 151 in 10/16 so kept her dose the same. Then found out she was pregnant and TSH 3.47 in 11/16 and changed to PTU 50 mg 1 tab in am and 1/2 tab in pm during 1st trimester and then changed back to MMI 10 mg 1/2 tab daily when she was in 2nd trimester. TSH 4.73 and TRAb 2.75 and TPO ab 29 in 1/17 and decreased to 1/2 tab 4x/week,  TSH 2.32 in 3/17 and decreased to 1/2 tab 3x/week and TSH 1.76 and TRAb 0.83 in 4/17 and kept her dose the same through delivery in 6/17. TSH 3.76 and TRAb < 0.50 in 9/17 so decreased to 1/2 tab 2x/week for 3 months and TSH 3.35 in 12/17 so decreased to 1/2 tab once a week and TSH down to 0.439 and TRAb up to 1.99 in 3/18 so went back to 1/2 tab 3x/week. TSH 3.88 in 6/18 so decreased back to 1/2 of 10 mg tab 2x/week and TSH up to 5.8 in 9/18 so decreased to 1/2 tab once a week  - cont methimazole 1/2 of 10 mg once a week  - check TSH and free T4 and TSH receptor antibody in 3 months and prior to next visit          Patient Instructions   1) Stay on methimazole 1/2 of 10 mg tab once a week for the next 3 months and then we'll repeat your labs    Follow-up Disposition:  Return in about 6 months (around 3/24/2019).     Copy sent to:  Dr. Hemalatha Astudillo via [facility]  Dr. Carlis Sicard [facility]    Lab follow up: 1/5/19  Component      Latest Ref Rng & Units 1/3/2019 1/3/2019 1/3/2019          10:49 AM 10:49 AM 10:49 AM   T4, Free      0.82 - 1.77 ng/dL   1.18   TSH      0.450 - 4.500 uIU/mL  2.930    Thyrotropin Receptor Ab, serum      0.00 - 1.75 IU/L 0.83       Sent her the following message through [employer]:  Your TSH (thyroid test) is normal at 2.93 and your TSH receptor antibody, which is a test for Grave's disease, is normal at 0.83 and down from 1.99 at your last check which means your Grave's disease is much less active than at last check. As long as this is elevated over 0.5, we will not be able to stop the medication so I recommend staying on 1/2 tab once a week for another 3 months and hopefully if your antibody level is undetectable at that time, we'll be able to finally stop the methimazole.

## 2018-09-24 NOTE — PATIENT INSTRUCTIONS
1) Stay on methimazole 1/2 of 10 mg tab once a week for the next 3 months and then we'll repeat your labs

## 2018-12-24 DIAGNOSIS — E05.00 GRAVES DISEASE: ICD-10-CM

## 2018-12-31 DIAGNOSIS — F41.9 ANXIETY DISORDER, UNSPECIFIED TYPE: Primary | ICD-10-CM

## 2018-12-31 RX ORDER — ESCITALOPRAM OXALATE 10 MG/1
10 TABLET ORAL DAILY
Qty: 30 TAB | Refills: 2 | Status: SHIPPED | OUTPATIENT
Start: 2018-12-31 | End: 2019-03-25

## 2019-01-04 LAB
T4 FREE SERPL-MCNC: 1.18 NG/DL (ref 0.82–1.77)
TSH RECEP AB SER-ACNC: 0.83 IU/L (ref 0–1.75)
TSH SERPL DL<=0.005 MIU/L-ACNC: 2.93 UIU/ML (ref 0.45–4.5)

## 2019-03-09 LAB
T4 FREE SERPL-MCNC: 1.15 NG/DL (ref 0.82–1.77)
TSH RECEP AB SER-ACNC: <0.5 IU/L (ref 0–1.75)
TSH SERPL DL<=0.005 MIU/L-ACNC: 3.86 UIU/ML (ref 0.45–4.5)

## 2019-03-14 DIAGNOSIS — E05.00 GRAVES DISEASE: ICD-10-CM

## 2019-03-25 ENCOUNTER — OFFICE VISIT (OUTPATIENT)
Dept: ENDOCRINOLOGY | Age: 29
End: 2019-03-25

## 2019-03-25 VITALS
BODY MASS INDEX: 20.67 KG/M2 | WEIGHT: 105.3 LBS | HEART RATE: 86 BPM | HEIGHT: 60 IN | SYSTOLIC BLOOD PRESSURE: 130 MMHG | DIASTOLIC BLOOD PRESSURE: 80 MMHG

## 2019-03-25 DIAGNOSIS — E05.00 GRAVES DISEASE: Primary | ICD-10-CM

## 2019-03-25 NOTE — PATIENT INSTRUCTIONS
1) Stop the methimazole now that your TSH remains normal and your Grave's antibody test is undetectable. 2) Plan on repeating your labs in 1 month and we'll see how your levels are looking. 3) Let me know who you will be seeing for OB/GYN and I'll send them my note. 4) Taper off the lexapro by taking 1/2 tab every other day and then stop. If the anxiety worsens, then talk with your OB about safe meds to take during pregnancy. 5) Start taking a prenatal vitamin.     6) We can coordinate lab draws with your OB during pregnancy

## 2019-03-25 NOTE — PROGRESS NOTES
Chief Complaint   Patient presents with    Thyroid Problem     pcp and pharmacy confirmed     History of Present Illness: Florencio Lopez is a 33 y.o. female here for follow up of thyroid. Weight up 4 lbs since last visit in 9/18. She had the IUD removed about a month ago as she continued to bleed while on this and took a pregnancy test last night and it was positive. Has been seeing a NP at [facility] but isn't sure if she'll go there for OB care or back to Dr. Ian Damon and she'll let me know. She was put on lexapro 5 mg in 7/18 and felt this has taken the edge off her anxiety but called Dr. Hernandez Pandey in 12/18 and asked to go up on the dose and he wrote for 10 mg tabs but states she got scared and just has been taking 1/2 tab daily. Now that she is pregnant, she doesn't want to continue this so will taper off this as below. She plans on seeing a new psychiatrist in the next week and will discuss this with them. No chest pain or palpitations. No heat or cold intolerance. Overall has felt well on 1/2 tab of MMI once a week. Current Outpatient Medications   Medication Sig    escitalopram oxalate (LEXAPRO) 10 mg tablet Take 1 Tab by mouth daily.  methIMAzole (TAPAZOLE) 10 mg tablet Take 1/2 tab on Mon only--Dose change 9/18/18--updated med list--did not send prescription to the pharmacy    PSYLLIUM SEED, WITH SUGAR, (FIBER SUPPLEMENT PO) Take  by mouth. No current facility-administered medications for this visit.       Allergies   Allergen Reactions    Pcn [Penicillins] Unknown (comments)    Sulfa (Sulfonamide Antibiotics) Rash     A.s child     Review of Systems:  - Cardiovascular: no chest pain  - Neurological: no tremors  - Integumentary: skin is normal    Physical Examination:  Blood pressure 130/80, pulse 86, height 5' (1.524 m), weight 105 lb 4.8 oz (47.8 kg), currently breastfeeding.  - General: pleasant, no distress, good eye contact   - Neck: small goiter, no thyroid bruits  - Cardiovascular: regular, normal rate, nl s1 and s2, no m/r/g   - Respiratory: clear to auscultation bilaterally   - Integumentary: skin is normal, no edema  - Neurological: reflexes 2+ at biceps, no tremors  - Psychiatric: normal mood and affect    Data Reviewed:   Component      Latest Ref Rng & Units 3/8/2019 3/8/2019 3/8/2019          11:17 AM 11:17 AM 11:17 AM   T4, Free      0.82 - 1.77 ng/dL   1.15   TSH      0.450 - 4.500 uIU/mL  3.860    Thyrotropin Receptor Ab, serum      0.00 - 1.75 IU/L <0.50         Assessment/Plan:     1. Grave's disease: She has always been told she has normal thyroid function and delivered her son, Kelvin Del Castillo, on 12/21/15 and after delivery developed hypertension and needed metoprolol. Had her thyroid levels checked after delivery and TSH was normal but saw Dr. Bill Kaufman on 5/18/16 and TSH was low at 0.012 and T4 was high at 13.3. Repeat labs with me in 5/16 showed TSH 0.009, FT4 3.27 and T3 263 and TRAb of 1.97 and TPO ab of 43 and I started her on MMI 30 mg daily. TSH up to 0.013 and FT4/T3 normal in 6/16 so decreased to 20 mg daily and TSH up to 8.93 in 7/16 so decreased to 10 mg daily and TSH up to 33 in 8/16 so decreased to 5 mg daily. TSH normal at 0.61 and FT4 1.34 and T3 151 in 10/16 so kept her dose the same. Then found out she was pregnant and TSH 3.47 in 11/16 and changed to PTU 50 mg 1 tab in am and 1/2 tab in pm during 1st trimester and then changed back to MMI 10 mg 1/2 tab daily when she was in 2nd trimester. TSH 4.73 and TRAb 2.75 and TPO ab 29 in 1/17 and decreased to 1/2 tab 4x/week,  TSH 2.32 in 3/17 and decreased to 1/2 tab 3x/week and TSH 1.76 and TRAb 0.83 in 4/17 and kept her dose the same through delivery in 6/17. TSH 3.76 and TRAb < 0.50 in 9/17 so decreased to 1/2 tab 2x/week for 3 months and TSH 3.35 in 12/17 so decreased to 1/2 tab once a week and TSH down to 0.439 and TRAb up to 1.99 in 3/18 so went back to 1/2 tab 3x/week.   TSH 3.88 in 6/18 so decreased back to 1/2 of 10 mg tab 2x/week and TSH up to 5.8 in 9/18 so decreased to 1/2 tab once a week and TSH 2.93 and TRAb 0.83 in 1/19 so kept dose the same. TSH 3.86 and TRAb <0.50 in 3/19 so will try stopping the methimazole at this time now that her Grave's antibody test is undetectable and she is pregnant. Will repeat labs in 1 month to see if she remains in remission or if we have to go back on PTU as she will still be in her first trimester. Will coordinate lab draws with her OB/GYN during pregnancy once she lets me know whom she'll be seeing.  - stop methimazole 1/2 of 10 mg once a week  - check TSH and free T4 and TSH receptor antibody in 1 month and prior to next visit          Patient Instructions   1) Stop the methimazole now that your TSH remains normal and your Grave's antibody test is undetectable. 2) Plan on repeating your labs in 1 month and we'll see how your levels are looking. 3) Let me know who you will be seeing for OB/GYN and I'll send them my note. 4) Taper off the lexapro by taking 1/2 tab every other day and then stop. If the anxiety worsens, then talk with your OB about safe meds to take during pregnancy. 5) Start taking a prenatal vitamin. 6) We can coordinate lab draws with your OB during pregnancy    Follow-up and Dispositions    · Return in about 6 months (around 9/25/2019). Copy sent to:  Dr. Dhiraj Julio via [facility]  Dr. Yovanny Bravo follow up: 5/1/19    Component      Latest Ref Rng & Units 4/29/2019 4/29/2019 4/29/2019          11:45 AM 11:45 AM 11:45 AM   T4, Free      0.82 - 1.77 ng/dL   1.06   TSH      0.450 - 4.500 uIU/mL  2.630    Thyrotropin Receptor Ab, serum      0.00 - 1.75 IU/L <0.50       She wrote to me on 4/7/19 that she ended up miscarrying.     Sent her the following message through [employer]:  Your TSH (thyroid test) remains normal off the methimazole and your TSH receptor antibody, which is a test for Grave's disease, remains undetectable so your Grave's disease appears to be in remission at this time and I recommend you stay off methimazole until you come back to see me in September. If you have any concern that your hyperthyroidism may be returning, please go and have your labs drawn sooner and let me know so I can be on the lookout for the results. Lab follow up: 8/22/19  Component      Latest Ref Rng & Units 8/16/2019 8/16/2019 8/16/2019          12:52 PM 12:52 PM 12:52 PM   T4, Free      0.82 - 1.77 ng/dL   1.06   TSH      0.450 - 4.500 uIU/mL  2.800    Thyrotropin Receptor Ab, serum      0.00 - 1.75 IU/L <1.10       She wrote on 8/10/19 that she is currently 6 weeks pregnant. Sent her the following message through [employer]:  Congratulations on the pregnancy. Your TSH is currently in the 1st trimester normal range of 0.5-3.0 and your TSH receptor antibody, which is a test for Grave's disease, remains undetectable so you currently don't need any medication for your thyroid. I recommend repeating your labs one more time a few days prior to your next visit in September. There should still be one more order in the system under my name dated 3/25/19 that you can use. Addendum: 9/28/19    She missed her appointment with me on 9/27/19 due to having 2 sick kids at home. Component      Latest Ref Rng & Units 9/24/2019 9/24/2019 9/24/2019          12:13 PM 12:13 PM 12:13 PM   T4, Free      0.82 - 1.77 ng/dL   1.03   TSH      0.450 - 4.500 uIU/mL  2.380    Thyrotropin Receptor Ab, serum      0.00 - 1.75 IU/L <1.10       Sent her the following message through [employer]:  Your TSH is normal at 2.38 and your TSH receptor antibody, which is a test for Grave's disease, remains undetectable so you remain in remission from your Grave's disease and don't need any medication for your thyroid.   At this point, I recommend that your OB repeats your TSH one more time in your 3rd trimester when you have your glucose screen and forward me the results to make sure no medication is needed at that time. As long as this is normal, you shouldn't need any treatment the rest of pregnancy and once you deliver, I would recommend you schedule a follow up appointment only if you have any new symptoms that would make you concerned your thyroid level is not normal (more fatigue, feeling excessively hot or cold, more constipation or diarrhea, more hair loss, etc). Otherwise you won't need to come back and see me in the future. Take care. Addendum: 6/4/20  Received labs from Dr. Mazin Miller from 5/28/20:    - TSH 2.85  - FT4 1.09    Sent her the following message through [employer]:    I received a fax from Dr. Ann Rojo office that your TSH (thyroid test) was normal at 2.85 and FT4 was normal at 1.09 so you continue to remain in remission from your hyperthyroidism and can contact me if you have any new symptoms of hyperthyroidism that would warrant your labs checked sooner. Please watch out for any symptoms of hyperthyroidism that would suggest you need to be seen sooner such as chest pain, heart racing, anxiety, tremors, trouble sleeping, feeling hot all the time, losing weight with out trying, hair loss, or diarrhea. If they occur, let me know. Lab follow up: 10/16/20  Component      Latest Ref Rng & Units 10/15/2020 10/15/2020 10/15/2020          10:36 AM 10:36 AM 10:36 AM   T4, Free      0.82 - 1.77 ng/dL   0.59 (L)   TSH      0.450 - 4.500 uIU/mL  48.300 (H)    Thyrotropin Receptor Ab, serum      0.00 - 1.75 IU/L 13.50 (H)       Sent her the following message through [employer]: So we now have a reason why you have been feeling the cold chills. Your TSH (thyroid test) is extremely high at 48 and your free T4 is low at 0.59 which means you are currently quite HYPOthyroid (slow metabolism--opposite of what you have been in the past).   Your TSH receptor antibody, which is a test for Grave's disease, is very high at 13.5 but my thought is that instead of this antibody stimulating your thyroid to become HYPERthyroid, it is now blocking your thyroid and causing HYPOthyroidism. This can happen in some patients over time, especially in the 6-12 months after delivery, which in your case, was in April 2020. Therefore, I think you would benefit from starting levothyroxine to give your body thyroid hormone as opposed to methimazole, which you have taken previously, that blocks your body from making thyroid hormone. I will begin levothyroxine 88 mcg daily. This will be ready for  at the pharmacy today. Take Levothyroxine either in the morning with just water, at least 30 minutes before breakfast and coffee and all other pills, or take it at bedtime on any empty stomach 2-3 hours after dinner. This must be  from vitamins, calcium, or iron by at least 4 hours. I would like to arrange a virtual visit to see me in 6 weeks to see how you are feeling and have your labs repeated the week before the visit. Can you be available Tuesday 12/1/20 at 8:50am?    I put an order directly into the [employer] system to repeat your labs in the 1-2 weeks prior to your next visit so just ask for the order under my name and you will receive a courtesy reminder through [employer] to have these drawn.

## 2019-03-30 ENCOUNTER — HOSPITAL ENCOUNTER (EMERGENCY)
Age: 29
Discharge: HOME | End: 2019-03-30
Attending: EMERGENCY MEDICINE
Payer: COMMERCIAL

## 2019-03-30 ENCOUNTER — APPOINTMENT (OUTPATIENT)
Dept: ULTRASOUND IMAGING | Age: 29
End: 2019-03-30
Attending: EMERGENCY MEDICINE
Payer: COMMERCIAL

## 2019-03-30 VITALS
BODY MASS INDEX: 20.86 KG/M2 | RESPIRATION RATE: 16 BRPM | HEART RATE: 85 BPM | HEIGHT: 60 IN | SYSTOLIC BLOOD PRESSURE: 123 MMHG | WEIGHT: 106.26 LBS | DIASTOLIC BLOOD PRESSURE: 86 MMHG | OXYGEN SATURATION: 100 % | TEMPERATURE: 98.2 F

## 2019-03-30 DIAGNOSIS — O20.0 THREATENED ABORTION: Primary | ICD-10-CM

## 2019-03-30 LAB
ALBUMIN SERPL-MCNC: 4.1 G/DL (ref 3.5–5)
ALBUMIN/GLOB SERPL: 1.2 {RATIO} (ref 1.1–2.2)
ALP SERPL-CCNC: 53 U/L (ref 45–117)
ALT SERPL-CCNC: 16 U/L (ref 12–78)
ANION GAP SERPL CALC-SCNC: 8 MMOL/L (ref 5–15)
APPEARANCE UR: CLEAR
AST SERPL-CCNC: 16 U/L (ref 15–37)
BACTERIA URNS QL MICRO: NEGATIVE /HPF
BASOPHILS # BLD: 0 K/UL (ref 0–0.1)
BASOPHILS NFR BLD: 0 % (ref 0–1)
BILIRUB SERPL-MCNC: 0.3 MG/DL (ref 0.2–1)
BILIRUB UR QL: NEGATIVE
BUN SERPL-MCNC: 14 MG/DL (ref 6–20)
BUN/CREAT SERPL: 25 (ref 12–20)
CALCIUM SERPL-MCNC: 8.9 MG/DL (ref 8.5–10.1)
CHLORIDE SERPL-SCNC: 109 MMOL/L (ref 97–108)
CO2 SERPL-SCNC: 23 MMOL/L (ref 21–32)
COLOR UR: ABNORMAL
CREAT SERPL-MCNC: 0.57 MG/DL (ref 0.55–1.02)
DIFFERENTIAL METHOD BLD: NORMAL
EOSINOPHIL # BLD: 0.1 K/UL (ref 0–0.4)
EOSINOPHIL NFR BLD: 2 % (ref 0–7)
EPITH CASTS URNS QL MICRO: ABNORMAL /LPF
ERYTHROCYTE [DISTWIDTH] IN BLOOD BY AUTOMATED COUNT: 12.3 % (ref 11.5–14.5)
GLOBULIN SER CALC-MCNC: 3.5 G/DL (ref 2–4)
GLUCOSE SERPL-MCNC: 85 MG/DL (ref 65–100)
GLUCOSE UR STRIP.AUTO-MCNC: NEGATIVE MG/DL
HCG SERPL-ACNC: 55 MIU/ML (ref 0–6)
HCT VFR BLD AUTO: 37.7 % (ref 35–47)
HGB BLD-MCNC: 12.9 G/DL (ref 11.5–16)
HGB UR QL STRIP: ABNORMAL
HYALINE CASTS URNS QL MICRO: ABNORMAL /LPF (ref 0–5)
IMM GRANULOCYTES # BLD AUTO: 0 K/UL (ref 0–0.04)
IMM GRANULOCYTES NFR BLD AUTO: 0 % (ref 0–0.5)
KETONES UR QL STRIP.AUTO: NEGATIVE MG/DL
LEUKOCYTE ESTERASE UR QL STRIP.AUTO: NEGATIVE
LYMPHOCYTES # BLD: 2.3 K/UL (ref 0.8–3.5)
LYMPHOCYTES NFR BLD: 38 % (ref 12–49)
MCH RBC QN AUTO: 29.1 PG (ref 26–34)
MCHC RBC AUTO-ENTMCNC: 34.2 G/DL (ref 30–36.5)
MCV RBC AUTO: 84.9 FL (ref 80–99)
MONOCYTES # BLD: 0.5 K/UL (ref 0–1)
MONOCYTES NFR BLD: 8 % (ref 5–13)
NEUTS SEG # BLD: 3.1 K/UL (ref 1.8–8)
NEUTS SEG NFR BLD: 52 % (ref 32–75)
NITRITE UR QL STRIP.AUTO: NEGATIVE
NRBC # BLD: 0 K/UL (ref 0–0.01)
NRBC BLD-RTO: 0 PER 100 WBC
PH UR STRIP: 7 [PH] (ref 5–8)
PLATELET # BLD AUTO: 364 K/UL (ref 150–400)
PMV BLD AUTO: 9.6 FL (ref 8.9–12.9)
POTASSIUM SERPL-SCNC: 3.5 MMOL/L (ref 3.5–5.1)
PROT SERPL-MCNC: 7.6 G/DL (ref 6.4–8.2)
PROT UR STRIP-MCNC: NEGATIVE MG/DL
RBC # BLD AUTO: 4.44 M/UL (ref 3.8–5.2)
RBC #/AREA URNS HPF: ABNORMAL /HPF (ref 0–5)
SODIUM SERPL-SCNC: 140 MMOL/L (ref 136–145)
SP GR UR REFRACTOMETRY: 1.02 (ref 1–1.03)
UA: UC IF INDICATED,UAUC: ABNORMAL
UROBILINOGEN UR QL STRIP.AUTO: 0.2 EU/DL (ref 0.2–1)
WBC # BLD AUTO: 6 K/UL (ref 3.6–11)
WBC URNS QL MICRO: ABNORMAL /HPF (ref 0–4)

## 2019-03-30 PROCEDURE — 85025 COMPLETE CBC W/AUTO DIFF WBC: CPT

## 2019-03-30 PROCEDURE — 99283 EMERGENCY DEPT VISIT LOW MDM: CPT

## 2019-03-30 PROCEDURE — 76801 OB US < 14 WKS SINGLE FETUS: CPT

## 2019-03-30 PROCEDURE — 84702 CHORIONIC GONADOTROPIN TEST: CPT

## 2019-03-30 PROCEDURE — 76817 TRANSVAGINAL US OBSTETRIC: CPT

## 2019-03-30 PROCEDURE — 80053 COMPREHEN METABOLIC PANEL: CPT

## 2019-03-30 PROCEDURE — 81001 URINALYSIS AUTO W/SCOPE: CPT

## 2019-03-30 PROCEDURE — 36415 COLL VENOUS BLD VENIPUNCTURE: CPT

## 2019-03-30 NOTE — LETTER
[address] 
[facility] EMERGENCY DEPT 
[address] [place] [address] [address] [id number] [phone] Work/School Note Date: 3/30/2019 To Whom It May concern: 
 
Kassandra Billy was seen and treated today in the emergency room by the following provider(s): 
Attending Provider: Benton Hart DO Physician Assistant: Rafia Joe, [facility]. aKssandra Billy may return to work on 4/1/19 Sincerely, 
 
 
 
 
SURJIT Chaudhari

## 2019-03-31 NOTE — ED PROVIDER NOTES
EMERGENCY DEPARTMENT HISTORY AND PHYSICAL EXAM 
 
 
Date: 3/30/2019 Patient Name: Francis Horner History of Presenting Illness HPI: Francis Horner is a 33 y.o. female with PMHx of ectopic pregnancy, PCOS, miscarriage, asthma, postpartum hypertension, presents the emergency room for vaginal bleeding during pregnancy. Patient says that she is approximately 5 to 6 weeks pregnant and that she started spotting earlier today. She says that she had a positive home pregnancy test but has not been seen by an OB/GYN yet. Patient says that her symptoms are constant and are not improving or worsening. She says that she has not gone through any pads today. She denies pelvic pain, nausea vomiting, fever/chills, vaginal discharge, among other associated symptoms. PCP: Suzanne Groves MD 
 
Current Outpatient Medications Medication Sig Dispense Refill  PSYLLIUM SEED, WITH SUGAR, (FIBER SUPPLEMENT PO) Take  by mouth. Past History Past Medical History: 
Past Medical History:  
Diagnosis Date  Asthma  Miscarriage  PCOS (polycystic ovarian syndrome)  Postpartum hypertension 12/31/2015 Past Surgical History: 
Past Surgical History:  
Procedure Laterality Date  HX GYN    
 ectopic pregnancy  HX HEENT    
 wisdom teeth extraction Family History: 
Family History Problem Relation Age of Onset  Thyroid Disease Maternal Grandmother   
     hypothyroidism  Diabetes Father  Heart Disease Father Social History: 
Social History Tobacco Use  Smoking status: Never Smoker  Smokeless tobacco: Never Used Substance Use Topics  Alcohol use: No  
 Drug use: No  
 
 
Allergies: Allergies Allergen Reactions  Pcn [Penicillins] Unknown (comments)  Sulfa (Sulfonamide Antibiotics) Rash A.s child Review of Systems Review of Systems Constitutional: Negative for activity change, appetite change, chills, diaphoresis, fatigue, fever and unexpected weight change. HENT: Negative for congestion, dental problem, ear pain, facial swelling, postnasal drip, sinus pressure, sinus pain, sore throat, trouble swallowing and voice change. Eyes: Negative for photophobia and pain. Respiratory: Negative for cough, shortness of breath and wheezing. Cardiovascular: Negative for chest pain, palpitations and leg swelling. Gastrointestinal: Negative for abdominal pain, blood in stool, constipation, diarrhea, nausea and vomiting. Endocrine: Negative for polydipsia, polyphagia and polyuria. Genitourinary: Positive for vaginal bleeding. Negative for dysuria, flank pain, frequency, hematuria, menstrual problem, pelvic pain, urgency and vaginal discharge. Musculoskeletal: Negative for back pain, joint swelling, myalgias, neck pain and neck stiffness. Skin: Negative for color change, pallor, rash and wound. Neurological: Negative for dizziness, syncope, speech difficulty, weakness, light-headedness, numbness and headaches. Physical Exam  
 
Vitals:  
 03/30/19 1914 BP: 123/86 Pulse: 85 Resp: 16 Temp: 98.2 °F (36.8 °C) SpO2: 100% Weight: 48.2 kg (106 lb 4.2 oz) Height: 5' (1.524 m) Physical Exam  
Constitutional: She is oriented to person, place, and time. She appears well-developed and well-nourished. No distress. Cardiovascular: Normal rate, regular rhythm, normal heart sounds and intact distal pulses. Pulmonary/Chest: Effort normal and breath sounds normal. No respiratory distress. Abdominal: Soft. Bowel sounds are normal. She exhibits no distension and no mass. There is no tenderness. There is no rebound and no guarding. Neurological: She is alert and oriented to person, place, and time. Skin: Skin is warm and dry. No rash noted. She is not diaphoretic. No erythema. No pallor. Psychiatric: She has a normal mood and affect.  Her behavior is normal. Judgment and thought content normal.  
Nursing note and vitals reviewed. Diagnostic Study Results Labs - No results found for this or any previous visit (from the past 12 hour(s)). Radiologic Studies -  
US UTS TRANSVAGINAL OB Final Result IMPRESSION: Normal uterus and ovaries. No intrauterine pregnancy is identified. The differential diagnosis includes an early intrauterine gestation, a missed  
 or an ectopic gestation. Clinical correlation, serial beta hCG and  
followup ultrasound is recommended. US PREG UTS < 14 WKS SNGL Final Result IMPRESSION: Normal uterus and ovaries. No intrauterine pregnancy is identified. The differential diagnosis includes an early intrauterine gestation, a missed  
 or an ectopic gestation. Clinical correlation, serial beta hCG and  
followup ultrasound is recommended. CT Results  (Last 48 hours) None CXR Results  (Last 48 hours) None Medical Decision Making I am the first provider for this patient. I reviewed the vital signs, available nursing notes, past medical history, past surgical history, family history, social history ED Course:  
Initial assessment performed. The patients presenting problems have been discussed, and they are in agreement with the care plan formulated and outlined with them. I have encouraged them to ask questions as they arise throughout their visit. Vital Signs-Reviewed the patient's vital signs. Vitals:  
 19 1914 BP: 123/86 BP 1 Location: Left arm BP Patient Position: Sitting Pulse: 85 Resp: 16 Temp: 98.2 °F (36.8 °C) SpO2: 100% Weight: 48.2 kg (106 lb 4.2 oz) Height: 5' (1.524 m) Medications Administered During ED Course Medications - No data to display Progress Note: On re evaluation pt is resting comfortably, is requesting discharge, and has no new complaints, changes, or physical findings. Disposition: D/c home DISCHARGE NOTE:  
I Counseled the patient on diagnosis and care plan. All available lab and imaging results have been reviewed by me and were discussed with the patient, including all incidental findings. The likelihood of other entities in the differential is insufficient to justify any further testing for them. This was explained to the patient. Patient agrees with plan and agrees to follow up with OBGYN as recommended, or return to the ED if their symptoms worsen. All medications were reviewed with the patient. All of pt's questions and concerns were addressed. The patient was advised that new or worsening symptoms would require further evaluation and should prompt immediate return to the Emergency Department. Discharge instructions have been provided and explained to the patient, along with reasons to return to the ED. Patient voices understanding and is agreeable with the plan for discharge. Patient is ready to go home. Follow-up Information Follow up With Specialties Details Why Contact Info Viet De Los Santos MD Obstetrics & Gynecology, Gynecology, Obstetrics Schedule an appointment as soon as possible for a visit  [address] [facility] [address] 
[phone] \[facility] [place] [facility]\"" EMERGENCY DEPT Emergency Medicine Go to If symptoms worsen [address] [facility] 
[phone] Discharge Medication List as of 3/30/2019  9:39 PM  
  
 
 
Provider Notes (Medical Decision Making):  
Differential diagnosis: Threatened , missed , ectopic pregnancy, intrauterine pregnancy Procedures: 
Procedures Critical Care Time: none Diagnosis Clinical Impression: 1. Threatened  Please note that this dictation was completed with [employer] the [employer] voice recognition software.   Quite often unanticipated grammatical, syntax, homophones, and other interpretive errors are inadvertently transcribed by the computer software. Please disregard these errors. Please excuse any errors that have escaped final proofreading.

## 2019-03-31 NOTE — DISCHARGE INSTRUCTIONS
Patient Education        Threatened Miscarriage: Care Instructions  Your Care Instructions    Some women have light spotting or bleeding during the first 12 weeks of pregnancy. In some cases this is normal. Light spotting or bleeding can also be a sign of a possible loss of the pregnancy. This is called a threatened miscarriage. At this point, the doctor may not be able to tell if your vaginal bleeding is normal or is a sign of a miscarriage. In early pregnancy, things such as stress, exercise, and sex do not cause miscarriage. You may be worried or upset about the possibility of losing your pregnancy. But do not blame yourself. There is no treatment to stop a threatened miscarriage. If you do have a miscarriage, there was nothing you could have done to prevent it. A miscarriage usually means that the pregnancy is not developing normally. The doctor has checked you carefully, but problems can develop later. If you notice any problems or new symptoms, get medical treatment right away. Follow-up care is a key part of your treatment and safety. Be sure to make and go to all appointments, and call your doctor if you are having problems. It's also a good idea to know your test results and keep a list of the medicines you take. How can you care for yourself at home? · If you do have a miscarriage, you will probably have some vaginal bleeding for 1 to 2 weeks. Use pads instead of tampons. · Take acetaminophen (Tylenol) for cramps. Read and follow all instructions on the label. · Do not take two or more pain medicines at the same time unless the doctor told you to. Many pain medicines have acetaminophen, which is Tylenol. Too much acetaminophen (Tylenol) can be harmful. · Do not have sex until your doctor says it is okay. · Get lots of rest over the next several days. · You may do your normal activities if you feel well enough to do them. But do not do any heavy exercise until your doctor says it is okay.   · Eat a balanced diet that is high in iron and vitamin C. Foods rich in iron include red meat, shellfish, eggs, beans, and leafy green vegetables. Foods high in vitamin C include citrus fruits, tomatoes, and broccoli. Talk to your doctor about whether you need to take iron pills or a multivitamin. · Do not drink alcohol or use tobacco or illegal drugs. · Do not smoke. If you need help quitting, talk to your doctor about stop-smoking programs and medicines. These can increase your chances of quitting for good. When should you call for help? Call 911 anytime you think you may need emergency care. For example, call if:    · You passed out (lost consciousness).    Call your doctor now or seek immediate medical care if:    · You have severe vaginal bleeding.     · You are dizzy or lightheaded, or you feel like you may faint.     · You have new or worse pain in your belly or pelvis.     · You have a fever.     · You have vaginal discharge that smells bad.    Watch closely for changes in your health, and be sure to contact your doctor if:    · You do not get better as expected. Where can you learn more? Go to http://zechariah-levi.info/. Enter X591 in the search box to learn more about \"Threatened Miscarriage: Care Instructions. \"  Current as of: September 5, 2018  Content Version: 11.9  © 8161-9598 [employer]. Care instructions adapted under license by [employer] (which disclaims liability or warranty for this information). If you have questions about a medical condition or this instruction, always ask your healthcare professional. [place] [address] any warranty or liability for your use of this information.

## 2019-03-31 NOTE — ED NOTES
Assumed care of pt. Pt reports she had a positive pregnancy test a week ago, she should be 6 weeks pregnant. Pt reports hx of miscarriage and ectopic pregnancy and states she has some vaginal bleeding that is a bit more than spotting, reports blood is bright red in color. Pt reported she has anxiety over past miscarriage and ectopic pregnancy and did not want to wait until Monday for an ultrasound. Pt has hx of Grave's disease. Pt has hx of pre-eclampsia.

## 2019-03-31 NOTE — ED NOTES
SURJIT Solorzano reviewed discharge instructions with the patient. The patient verbalized understanding. All questions and concerns were addressed. The patient declined a wheelchair and is discharged ambulatory in the care of family members with instructions and prescriptions in hand. Pt is alert and oriented x 4. Respirations are clear and unlabored.

## 2019-04-30 LAB
T4 FREE SERPL-MCNC: 1.06 NG/DL (ref 0.82–1.77)
TSH RECEP AB SER-ACNC: <0.5 IU/L (ref 0–1.75)
TSH SERPL DL<=0.005 MIU/L-ACNC: 2.63 UIU/ML (ref 0.45–4.5)

## 2019-06-11 DIAGNOSIS — B37.31 MONILIAL VAGINITIS: Primary | ICD-10-CM

## 2019-06-11 RX ORDER — FLUCONAZOLE 150 MG/1
150 TABLET ORAL DAILY
Qty: 1 TAB | Refills: 3 | Status: SHIPPED | OUTPATIENT
Start: 2019-06-11 | End: 2019-06-12

## 2019-08-12 DIAGNOSIS — E05.00 GRAVES DISEASE: Primary | ICD-10-CM

## 2019-08-17 LAB
T4 FREE SERPL-MCNC: 1.06 NG/DL (ref 0.82–1.77)
TSH RECEP AB SER-ACNC: <1.1 IU/L (ref 0–1.75)
TSH SERPL DL<=0.005 MIU/L-ACNC: 2.8 UIU/ML (ref 0.45–4.5)

## 2019-09-03 LAB
ANTIBODY SCREEN, EXTERNAL: NEGATIVE
CHLAMYDIA, EXTERNAL: NEGATIVE
HBSAG, EXTERNAL: NEGATIVE
HIV, EXTERNAL: NONREACTIVE
N. GONORRHEA, EXTERNAL: NEGATIVE
RPR, EXTERNAL: NORMAL
RUBELLA, EXTERNAL: NORMAL
TYPE, ABO & RH, EXTERNAL: NORMAL

## 2019-09-25 LAB
T4 FREE SERPL-MCNC: 1.03 NG/DL (ref 0.82–1.77)
TSH RECEP AB SER-ACNC: <1.1 IU/L (ref 0–1.75)
TSH SERPL DL<=0.005 MIU/L-ACNC: 2.38 UIU/ML (ref 0.45–4.5)

## 2019-09-30 ENCOUNTER — TELEPHONE (OUTPATIENT)
Dept: ENDOCRINOLOGY | Age: 29
End: 2019-09-30

## 2020-02-07 DIAGNOSIS — Z34.90 PREGNANCY, UNSPECIFIED GESTATIONAL AGE: ICD-10-CM

## 2020-02-07 DIAGNOSIS — R07.9 CHEST PAIN, UNSPECIFIED TYPE: Primary | ICD-10-CM

## 2020-03-10 LAB — GRBS, EXTERNAL: POSITIVE

## 2020-04-05 ENCOUNTER — HOSPITAL ENCOUNTER (INPATIENT)
Age: 30
LOS: 2 days | Discharge: HOME | End: 2020-04-07
Attending: OBSTETRICS & GYNECOLOGY | Admitting: ADVANCED PRACTICE MIDWIFE
Payer: COMMERCIAL

## 2020-04-05 PROBLEM — Z34.90 PREGNANCY: Status: ACTIVE | Noted: 2020-04-05

## 2020-04-05 LAB
A1 MICROGLOB PLACENTAL VAG QL: NEGATIVE
ALBUMIN SERPL-MCNC: 3 G/DL (ref 3.5–5)
ALBUMIN/GLOB SERPL: 0.8 {RATIO} (ref 1.1–2.2)
ALP SERPL-CCNC: 189 U/L (ref 45–117)
ALT SERPL-CCNC: 17 U/L (ref 12–78)
ANION GAP SERPL CALC-SCNC: 11 MMOL/L (ref 5–15)
AST SERPL-CCNC: 15 U/L (ref 15–37)
BASOPHILS # BLD: 0 K/UL (ref 0–0.1)
BASOPHILS NFR BLD: 0 % (ref 0–1)
BILIRUB SERPL-MCNC: 0.4 MG/DL (ref 0.2–1)
BUN SERPL-MCNC: 7 MG/DL (ref 6–20)
BUN/CREAT SERPL: 15 (ref 12–20)
CALCIUM SERPL-MCNC: 9.2 MG/DL (ref 8.5–10.1)
CHLORIDE SERPL-SCNC: 105 MMOL/L (ref 97–108)
CO2 SERPL-SCNC: 22 MMOL/L (ref 21–32)
CONTROL LINE PRESENT?: NORMAL
CREAT SERPL-MCNC: 0.48 MG/DL (ref 0.55–1.02)
CREAT UR-MCNC: 40.9 MG/DL
DIFFERENTIAL METHOD BLD: ABNORMAL
EOSINOPHIL # BLD: 0.1 K/UL (ref 0–0.4)
EOSINOPHIL NFR BLD: 1 % (ref 0–7)
ERYTHROCYTE [DISTWIDTH] IN BLOOD BY AUTOMATED COUNT: 15.4 % (ref 11.5–14.5)
EXPIRATION DATE: NORMAL
GLOBULIN SER CALC-MCNC: 3.7 G/DL (ref 2–4)
GLUCOSE SERPL-MCNC: 68 MG/DL (ref 65–100)
HCT VFR BLD AUTO: 36.3 % (ref 35–47)
HGB BLD-MCNC: 11.9 G/DL (ref 11.5–16)
IMM GRANULOCYTES # BLD AUTO: 0.1 K/UL (ref 0–0.04)
IMM GRANULOCYTES NFR BLD AUTO: 1 % (ref 0–0.5)
INTERNAL NEGATIVE CONTROL: NORMAL
KIT LOT NO.: NORMAL
LDH SERPL L TO P-CCNC: 187 U/L (ref 81–246)
LYMPHOCYTES # BLD: 2.1 K/UL (ref 0.8–3.5)
LYMPHOCYTES NFR BLD: 19 % (ref 12–49)
MCH RBC QN AUTO: 27.4 PG (ref 26–34)
MCHC RBC AUTO-ENTMCNC: 32.8 G/DL (ref 30–36.5)
MCV RBC AUTO: 83.4 FL (ref 80–99)
MONOCYTES # BLD: 0.9 K/UL (ref 0–1)
MONOCYTES NFR BLD: 8 % (ref 5–13)
NEUTS SEG # BLD: 7.8 K/UL (ref 1.8–8)
NEUTS SEG NFR BLD: 71 % (ref 32–75)
NRBC # BLD: 0 K/UL (ref 0–0.01)
NRBC BLD-RTO: 0 PER 100 WBC
PLATELET # BLD AUTO: 186 K/UL (ref 150–400)
PMV BLD AUTO: 9.9 FL (ref 8.9–12.9)
POTASSIUM SERPL-SCNC: 3.6 MMOL/L (ref 3.5–5.1)
PROT SERPL-MCNC: 6.7 G/DL (ref 6.4–8.2)
PROT UR-MCNC: 12 MG/DL (ref 0–11.9)
PROT/CREAT UR-RTO: 0.3
RBC # BLD AUTO: 4.35 M/UL (ref 3.8–5.2)
SODIUM SERPL-SCNC: 138 MMOL/L (ref 136–145)
URATE SERPL-MCNC: 5 MG/DL (ref 2.6–6)
WBC # BLD AUTO: 11.1 K/UL (ref 3.6–11)

## 2020-04-05 PROCEDURE — 3E0P7VZ INTRODUCTION OF HORMONE INTO FEMALE REPRODUCTIVE, VIA NATURAL OR ARTIFICIAL OPENING: ICD-10-PCS | Performed by: ADVANCED PRACTICE MIDWIFE

## 2020-04-05 PROCEDURE — 65270000029 HC RM PRIVATE

## 2020-04-05 PROCEDURE — 36415 COLL VENOUS BLD VENIPUNCTURE: CPT

## 2020-04-05 PROCEDURE — 75410000002 HC LABOR FEE PER 1 HR

## 2020-04-05 PROCEDURE — 80053 COMPREHEN METABOLIC PANEL: CPT

## 2020-04-05 PROCEDURE — 84156 ASSAY OF PROTEIN URINE: CPT

## 2020-04-05 PROCEDURE — 84112 EVAL AMNIOTIC FLUID PROTEIN: CPT | Performed by: ADVANCED PRACTICE MIDWIFE

## 2020-04-05 PROCEDURE — 83615 LACTATE (LD) (LDH) ENZYME: CPT

## 2020-04-05 PROCEDURE — 74011250637 HC RX REV CODE- 250/637: Performed by: ADVANCED PRACTICE MIDWIFE

## 2020-04-05 PROCEDURE — 85025 COMPLETE CBC W/AUTO DIFF WBC: CPT

## 2020-04-05 PROCEDURE — 84550 ASSAY OF BLOOD/URIC ACID: CPT

## 2020-04-05 RX ORDER — SODIUM CHLORIDE 0.9 % (FLUSH) 0.9 %
5-40 SYRINGE (ML) INJECTION AS NEEDED
Status: DISCONTINUED | OUTPATIENT
Start: 2020-04-05 | End: 2020-04-06

## 2020-04-05 RX ORDER — ZOLPIDEM TARTRATE 5 MG/1
5 TABLET ORAL
Status: DISCONTINUED | OUTPATIENT
Start: 2020-04-05 | End: 2020-04-06

## 2020-04-05 RX ORDER — GUAIFENESIN 100 MG/5ML
81 LIQUID (ML) ORAL DAILY
COMMUNITY
End: 2020-04-07

## 2020-04-05 RX ORDER — CYANOCOBALAMIN (VITAMIN B-12) 1000 MCG
1 TABLET, EXTENDED RELEASE ORAL DAILY
COMMUNITY
End: 2020-04-07

## 2020-04-05 RX ORDER — MAG HYDROX/ALUMINUM HYD/SIMETH 200-200-20
30 SUSPENSION, ORAL (FINAL DOSE FORM) ORAL
Status: DISCONTINUED | OUTPATIENT
Start: 2020-04-05 | End: 2020-04-06

## 2020-04-05 RX ORDER — SODIUM CHLORIDE 0.9 % (FLUSH) 0.9 %
5-40 SYRINGE (ML) INJECTION EVERY 8 HOURS
Status: DISCONTINUED | OUTPATIENT
Start: 2020-04-05 | End: 2020-04-06

## 2020-04-05 RX ORDER — NALOXONE HYDROCHLORIDE 0.4 MG/ML
0.4 INJECTION, SOLUTION INTRAMUSCULAR; INTRAVENOUS; SUBCUTANEOUS AS NEEDED
Status: DISCONTINUED | OUTPATIENT
Start: 2020-04-05 | End: 2020-04-06

## 2020-04-05 RX ADMIN — MISOPROSTOL 50 MCG: 100 TABLET ORAL at 18:14

## 2020-04-05 NOTE — PROGRESS NOTES
4/5/2020  4:02 PM Patient arrived ambulatory to L&D 4 for scheduled induction for postdates/HX PP PIH. States positive fetal movement; denies loss of fluid, vaginal bleeding or contractions. 1111 Madelin Cornejo Sinks at bedside, viewed fetal tracing, discussed plan of care. SVE 1/60/-3.    [id number] Patient sitting up in bed, eating. FHR audible to doppler. Monitor adjusted. 1930 Bedside and Verbal shift change report given to IZZY Cuellar RN (oncoming nurse) by Belinda Jackson RN (offgoing nurse). Report included the following information SBAR, Kardex, Procedure Summary, Intake/Output, MAR and Recent Results.

## 2020-04-05 NOTE — H&P
History and Physical    Patient: Rachel Strong MRN: [id number]  SSN: [id number]    YOB: 1990  Age: 30 y.o. Sex: female      Subjective:      Rachel Strong is a 30 y.o. female [id number] at 40w1d with an EFRAIN of 4/4/20. She presents to labor and delivery for an elective induction of labor. Pt denies LOF, VB, Ctx and endorses good fetal movement. Pregnancy complicated by history of postpartum pre-e with two prior vaginal deliveries. Both times it occurred at 1 week postpartum. Pt will need early pp appt. Pregnancy also complicated by GBS positive status, Pt PCN allergic, GBS is clinda resistant. Pt also has history of graves disease- not on meds, being follow by endo: Dr. Aneesh Andres. Pt is rh negative. Past Medical History:   Diagnosis Date    Abnormal Papanicolaou smear of cervix     colpo normal    Anemia     Asthma     Complication of anesthesia     \"low tolerance\"    Miscarriage     PCOS (polycystic ovarian syndrome)     Polycystic disease, ovaries     questionable    Postpartum hypertension 12/31/2015    also 2017    Psychiatric problem     depression/ anxiety    Thyroid activity decreased      Past Surgical History:   Procedure Laterality Date    HX GYN      ectopic pregnancy    HX HEENT      wisdom teeth extraction    HX OTHER SURGICAL        Family History   Problem Relation Age of Onset    Thyroid Disease Maternal Grandmother         hypothyroidism    Diabetes Father     Heart Disease Father      Social History     Tobacco Use    Smoking status: Never Smoker    Smokeless tobacco: Never Used   Substance Use Topics    Alcohol use: No      Prior to Admission medications    Medication Sig Start Date End Date Taking? Authorizing Provider   PNV66-Iron Fumarate-FA-DSS-DHA 26-1.2- mg cap Take  by mouth. Yes Provider, Historical   iron, carbonyl (FEOSOL) 45 mg tab Take 1 Tab by mouth daily.    Yes Provider, Historical   aspirin 81 mg chewable tablet Take 81 mg by mouth daily. Yes Provider, Historical   PSYLLIUM SEED, WITH SUGAR, (FIBER SUPPLEMENT PO) Take  by mouth. Provider, Historical        Allergies   Allergen Reactions    Pcn [Penicillins] Unknown (comments)    Sulfa (Sulfonamide Antibiotics) Rash     A.s child       Review of Systems:  A comprehensive review of systems was negative except for that written in the History of Present Illness. Objective:     Vitals:    04/05/20 1607 04/05/20 1617   BP: 122/81    Pulse: 96    Resp: 16    Temp: 98 °F (36.7 °C)    Weight:  66.7 kg (147 lb)   Height:  5' (1.524 m)        Physical Exam:  GENERAL: alert, cooperative, no distress, appears stated age  LUNG: clear to auscultation bilaterally  HEART: regular rate and rhythm, S1, S2 normal, no murmur, click, rub or gallop  ABDOMEN: soft, non-tender. Bowel sounds normal. No masses,  no organomegaly, gravid  EXTREMITIES:  extremities normal, atraumatic, no cyanosis or edema  SKIN: Normal.  NEUROLOGIC: negative  PSYCHIATRIC: WNL    SVE: 1/60/-3, vertex, intact    NST: Monitored for 20 minutes, reactive, cat 1, baseline 140, positive accels, no decels, moderate variability, occasional mild ctx. Patient Vitals for the past 4 hrs:   Temp Pulse Resp BP   04/05/20 1607 98 °F (36.7 °C) 96 16 122/81       Assessment:     Hospital Problems  Date Reviewed: 3/25/2019          Codes Class Noted POA    Pregnancy ICD-10-CM: Z34.90  ICD-9-CM: V22.2  4/5/2020 Unknown            Elective IOL  GBS Positive  A Negative  Hx of Postpartum Pre E x 2  Hx of Graves disease  Hep B and HIV Neg  Rubella Immune    Plan:     Admit to inpatient  NST, IV, CBC, STBB  History of pre e- complete Pre e labs completed- CMP, Uric Acid, LDH, P/C ratio    Discussed in detail IOL and need for cervical ripening with cytotec. All questions answered. Cytotec 50 mcg buccal q 4 hr for up to 6 doses. If pt starts germán recheck cervix to confirm need for further cytotec. If ripened proceed with pitocin.      Vanc for GBS positive status once ctx start. Maternal and fetal monitoring per protocol.   Anticipate vaginal delivery    Signed By: Yulia Eduardo CNM     April 5, 2020

## 2020-04-05 NOTE — PROGRESS NOTES
1930:  Bedside and Verbal shift change report given to Almaz Karimi RN (oncoming nurse) by Maggie Mai RN (offgoing nurse). Report included the following information SBAR, Kardex, Intake/Output, MAR and Recent Results. Pt in restroom. Instructed to call out when finished. 2030:  Spoke with Amberly Machado about monitoring protocol for cytotec induction. Plan to keep pt on continuous monitoring since pt is germán regularly. 2315:  Pt called out stating she felt a gush. Pt said she felt a gush of clear fluid. Pt doesn't feel any leaking. RN Nitrazine tested underwear (positive), but Amnisure was negative. 0014:  Discussed plan of care and strip review with KELY Macias CNM. Plan to start pitocin if contractions begin to decrease in intensity. Verbal order for 1000LR bolus. 0145:  RN at bedside to adjust US monitor. Pt tearful and unsure of plan for induction. Pt feels like she was pushed into an induction and this 'just doesn't feel right. \" RN offered support for pt and spouse. RN gave option to speak to the provider, wait to start pitocin induction, SVE, pain relief options, and repositioning. RN attempted cervical check, pt unable to tolerate. 0200:  Pt states she is ready to start pitocin. She \"feels tired and is ready to get it over with\"  0400:  RN at bedside to increase pitocin titration. Pt verbalizes discomfort with increasing the rate. RN discussed pain management options and pt refused. Offered SVE to help determine plan of care and informed KELY Macias CNM. 0596Dasia Conway CNM at bedside to clarify plan of care. Pt is tearful. KELY Macias CNM offered education on IOL, epidural, and pain management. Addison Jeter CNM encouraged pt to take part in her care. Pt became agitated and began to curse at [facility]. Plan to transfer care to Dr Danni Bronson. 459:  Dr Danni Bronson at bedside. Pt requesting an epidural.  0525:  Dr Eddy Mott at bedside for epidural placement.     603:  Pt states she is having a hard time breathing. Epidural stopped and pt HOB elevated. Dermatome level T4  0700:  Dermatome level 7  0720:   Pt refused barksdale catheter. Will contact anesthesia to restart epidural and re attempt barksdale when pt is more comfortable. 200:  Spoke with  [facility]. Order to turn epidural back on at 10mL/hr and decrease to 8mL/hr if level becomes too high. 0740: Bedside and Verbal shift change report given to [address] [place] [address] Road (oncoming nurse) by Brittany Burr RN (offgoing nurse). Report included the following information SBAR, Kardex, Intake/Output, MAR, Accordion and Recent Results.

## 2020-04-06 ENCOUNTER — ANESTHESIA EVENT (OUTPATIENT)
Dept: LABOR AND DELIVERY | Age: 30
End: 2020-04-06
Payer: COMMERCIAL

## 2020-04-06 ENCOUNTER — ANESTHESIA (OUTPATIENT)
Dept: LABOR AND DELIVERY | Age: 30
End: 2020-04-06
Payer: COMMERCIAL

## 2020-04-06 PROCEDURE — 77030019905 HC CATH URETH INTMIT MDII -A

## 2020-04-06 PROCEDURE — 74011250636 HC RX REV CODE- 250/636: Performed by: ANESTHESIOLOGY

## 2020-04-06 PROCEDURE — 74011250636 HC RX REV CODE- 250/636

## 2020-04-06 PROCEDURE — 77030040830 HC CATH URETH FOL MDII -A

## 2020-04-06 PROCEDURE — 74011250636 HC RX REV CODE- 250/636: Performed by: ADVANCED PRACTICE MIDWIFE

## 2020-04-06 PROCEDURE — 65410000002 HC RM PRIVATE OB

## 2020-04-06 PROCEDURE — 76060000078 HC EPIDURAL ANESTHESIA

## 2020-04-06 PROCEDURE — 00HU33Z INSERTION OF INFUSION DEVICE INTO SPINAL CANAL, PERCUTANEOUS APPROACH: ICD-10-PCS | Performed by: ANESTHESIOLOGY

## 2020-04-06 PROCEDURE — 3E033VJ INTRODUCTION OF OTHER HORMONE INTO PERIPHERAL VEIN, PERCUTANEOUS APPROACH: ICD-10-PCS | Performed by: OBSTETRICS & GYNECOLOGY

## 2020-04-06 PROCEDURE — 74011000250 HC RX REV CODE- 250: Performed by: ANESTHESIOLOGY

## 2020-04-06 PROCEDURE — 75410000003 HC RECOV DEL/VAG/CSECN EA 0.5 HR

## 2020-04-06 PROCEDURE — 74011000250 HC RX REV CODE- 250

## 2020-04-06 PROCEDURE — 75410000000 HC DELIVERY VAGINAL/SINGLE

## 2020-04-06 PROCEDURE — A4300 CATH IMPL VASC ACCESS PORTAL: HCPCS

## 2020-04-06 PROCEDURE — 75410000002 HC LABOR FEE PER 1 HR

## 2020-04-06 RX ORDER — EPHEDRINE SULFATE/0.9% NACL/PF 50 MG/5 ML
SYRINGE (ML) INTRAVENOUS
Status: COMPLETED
Start: 2020-04-06 | End: 2020-04-06

## 2020-04-06 RX ORDER — OXYTOCIN/0.9 % SODIUM CHLORIDE 30/500 ML
0-25 PLASTIC BAG, INJECTION (ML) INTRAVENOUS
Status: DISCONTINUED | OUTPATIENT
Start: 2020-04-06 | End: 2020-04-06

## 2020-04-06 RX ORDER — ONDANSETRON 4 MG/1
4 TABLET, ORALLY DISINTEGRATING ORAL
Status: DISCONTINUED | OUTPATIENT
Start: 2020-04-06 | End: 2020-04-07 | Stop reason: HOSPADM

## 2020-04-06 RX ORDER — EPHEDRINE SULFATE/0.9% NACL/PF 50 MG/5 ML
12.5 SYRINGE (ML) INTRAVENOUS
Status: COMPLETED | OUTPATIENT
Start: 2020-04-06 | End: 2020-04-06

## 2020-04-06 RX ORDER — OXYTOCIN/RINGER'S LACTATE 20/1000 ML
999 PLASTIC BAG, INJECTION (ML) INTRAVENOUS AS NEEDED
Status: DISCONTINUED | OUTPATIENT
Start: 2020-04-06 | End: 2020-04-07 | Stop reason: HOSPADM

## 2020-04-06 RX ORDER — ZOLPIDEM TARTRATE 5 MG/1
5 TABLET ORAL
Status: DISCONTINUED | OUTPATIENT
Start: 2020-04-06 | End: 2020-04-07 | Stop reason: HOSPADM

## 2020-04-06 RX ORDER — LIDOCAINE HYDROCHLORIDE AND EPINEPHRINE 15; 5 MG/ML; UG/ML
INJECTION, SOLUTION EPIDURAL AS NEEDED
Status: DISCONTINUED | OUTPATIENT
Start: 2020-04-06 | End: 2020-04-06 | Stop reason: HOSPADM

## 2020-04-06 RX ORDER — SODIUM CHLORIDE, SODIUM LACTATE, POTASSIUM CHLORIDE, CALCIUM CHLORIDE 600; 310; 30; 20 MG/100ML; MG/100ML; MG/100ML; MG/100ML
125 INJECTION, SOLUTION INTRAVENOUS CONTINUOUS
Status: DISCONTINUED | OUTPATIENT
Start: 2020-04-06 | End: 2020-04-06

## 2020-04-06 RX ORDER — LIDOCAINE HYDROCHLORIDE 10 MG/ML
INJECTION INFILTRATION; PERINEURAL
Status: DISCONTINUED
Start: 2020-04-06 | End: 2020-04-06

## 2020-04-06 RX ORDER — HYDROCORTISONE ACETATE PRAMOXINE HCL 2.5; 1 G/100G; G/100G
CREAM TOPICAL AS NEEDED
Status: DISCONTINUED | OUTPATIENT
Start: 2020-04-06 | End: 2020-04-07 | Stop reason: HOSPADM

## 2020-04-06 RX ORDER — ACETAMINOPHEN 325 MG/1
650 TABLET ORAL
Status: DISCONTINUED | OUTPATIENT
Start: 2020-04-06 | End: 2020-04-07 | Stop reason: HOSPADM

## 2020-04-06 RX ORDER — FENTANYL CITRATE 50 UG/ML
INJECTION, SOLUTION INTRAMUSCULAR; INTRAVENOUS AS NEEDED
Status: DISCONTINUED | OUTPATIENT
Start: 2020-04-06 | End: 2020-04-06 | Stop reason: HOSPADM

## 2020-04-06 RX ORDER — HYDROCORTISONE 1 %
CREAM (GRAM) TOPICAL AS NEEDED
Status: DISCONTINUED | OUTPATIENT
Start: 2020-04-06 | End: 2020-04-07 | Stop reason: HOSPADM

## 2020-04-06 RX ORDER — SODIUM CHLORIDE 0.9 % (FLUSH) 0.9 %
5-40 SYRINGE (ML) INJECTION EVERY 8 HOURS
Status: DISCONTINUED | OUTPATIENT
Start: 2020-04-06 | End: 2020-04-07 | Stop reason: HOSPADM

## 2020-04-06 RX ORDER — FENTANYL CITRATE 50 UG/ML
INJECTION, SOLUTION INTRAMUSCULAR; INTRAVENOUS
Status: COMPLETED
Start: 2020-04-06 | End: 2020-04-06

## 2020-04-06 RX ORDER — SIMETHICONE 80 MG
80 TABLET,CHEWABLE ORAL
Status: DISCONTINUED | OUTPATIENT
Start: 2020-04-06 | End: 2020-04-07 | Stop reason: HOSPADM

## 2020-04-06 RX ORDER — FENTANYL/BUPIVACAINE/NS/PF 2-1250MCG
1-16 PREFILLED PUMP RESERVOIR EPIDURAL CONTINUOUS
Status: DISCONTINUED | OUTPATIENT
Start: 2020-04-06 | End: 2020-04-06

## 2020-04-06 RX ORDER — DIPHENHYDRAMINE HYDROCHLORIDE 50 MG/ML
12.5 INJECTION, SOLUTION INTRAMUSCULAR; INTRAVENOUS
Status: DISCONTINUED | OUTPATIENT
Start: 2020-04-06 | End: 2020-04-06

## 2020-04-06 RX ORDER — OXYTOCIN/RINGER'S LACTATE 20/1000 ML
125 PLASTIC BAG, INJECTION (ML) INTRAVENOUS AS NEEDED
Status: DISCONTINUED | OUTPATIENT
Start: 2020-04-06 | End: 2020-04-07 | Stop reason: HOSPADM

## 2020-04-06 RX ORDER — HYDROCODONE BITARTRATE AND ACETAMINOPHEN 7.5; 325 MG/1; MG/1
1 TABLET ORAL
Status: DISCONTINUED | OUTPATIENT
Start: 2020-04-06 | End: 2020-04-07 | Stop reason: HOSPADM

## 2020-04-06 RX ORDER — DOCUSATE SODIUM 100 MG/1
100 CAPSULE, LIQUID FILLED ORAL
Status: DISCONTINUED | OUTPATIENT
Start: 2020-04-06 | End: 2020-04-07 | Stop reason: HOSPADM

## 2020-04-06 RX ORDER — IBUPROFEN 400 MG/1
800 TABLET ORAL EVERY 8 HOURS
Status: DISCONTINUED | OUTPATIENT
Start: 2020-04-06 | End: 2020-04-07 | Stop reason: HOSPADM

## 2020-04-06 RX ORDER — FENTANYL/BUPIVACAINE/NS/PF 2-1250MCG
PREFILLED PUMP RESERVOIR EPIDURAL
Status: COMPLETED
Start: 2020-04-06 | End: 2020-04-06

## 2020-04-06 RX ORDER — FENTANYL CITRATE 50 UG/ML
100 INJECTION, SOLUTION INTRAMUSCULAR; INTRAVENOUS ONCE
Status: DISCONTINUED | OUTPATIENT
Start: 2020-04-06 | End: 2020-04-06

## 2020-04-06 RX ORDER — HYDROCODONE BITARTRATE AND ACETAMINOPHEN 5; 325 MG/1; MG/1
1 TABLET ORAL
Status: DISCONTINUED | OUTPATIENT
Start: 2020-04-06 | End: 2020-04-07 | Stop reason: HOSPADM

## 2020-04-06 RX ORDER — BUPIVACAINE HYDROCHLORIDE 5 MG/ML
INJECTION, SOLUTION EPIDURAL; INTRACAUDAL
Status: DISCONTINUED
Start: 2020-04-06 | End: 2020-04-06

## 2020-04-06 RX ORDER — DIPHENHYDRAMINE HCL 25 MG
25 CAPSULE ORAL
Status: DISCONTINUED | OUTPATIENT
Start: 2020-04-06 | End: 2020-04-07 | Stop reason: HOSPADM

## 2020-04-06 RX ORDER — SODIUM CHLORIDE 0.9 % (FLUSH) 0.9 %
5-40 SYRINGE (ML) INJECTION AS NEEDED
Status: DISCONTINUED | OUTPATIENT
Start: 2020-04-06 | End: 2020-04-07 | Stop reason: HOSPADM

## 2020-04-06 RX ORDER — NALOXONE HYDROCHLORIDE 0.4 MG/ML
0.4 INJECTION, SOLUTION INTRAMUSCULAR; INTRAVENOUS; SUBCUTANEOUS AS NEEDED
Status: DISCONTINUED | OUTPATIENT
Start: 2020-04-06 | End: 2020-04-06

## 2020-04-06 RX ORDER — AMMONIA 15 % (W/V)
1 AMPUL (EA) INHALATION AS NEEDED
Status: DISCONTINUED | OUTPATIENT
Start: 2020-04-06 | End: 2020-04-07 | Stop reason: HOSPADM

## 2020-04-06 RX ORDER — BUPIVACAINE HYDROCHLORIDE 2.5 MG/ML
INJECTION, SOLUTION EPIDURAL; INFILTRATION; INTRACAUDAL AS NEEDED
Status: DISCONTINUED | OUTPATIENT
Start: 2020-04-06 | End: 2020-04-06 | Stop reason: HOSPADM

## 2020-04-06 RX ORDER — BUPIVACAINE HYDROCHLORIDE 5 MG/ML
30 INJECTION, SOLUTION EPIDURAL; INTRACAUDAL AS NEEDED
Status: DISCONTINUED | OUTPATIENT
Start: 2020-04-06 | End: 2020-04-06

## 2020-04-06 RX ADMIN — BUPIVACAINE HYDROCHLORIDE 2 ML: 2.5 INJECTION, SOLUTION EPIDURAL; INFILTRATION; INTRACAUDAL; PERINEURAL at 05:35

## 2020-04-06 RX ADMIN — SODIUM CHLORIDE, SODIUM LACTATE, POTASSIUM CHLORIDE, AND CALCIUM CHLORIDE 1000 ML: 600; 310; 30; 20 INJECTION, SOLUTION INTRAVENOUS at 00:31

## 2020-04-06 RX ADMIN — Medication 10 ML/HR: at 05:43

## 2020-04-06 RX ADMIN — Medication 12.5 MG: at 06:49

## 2020-04-06 RX ADMIN — BUPIVACAINE HYDROCHLORIDE 2 ML: 2.5 INJECTION, SOLUTION EPIDURAL; INFILTRATION; INTRACAUDAL; PERINEURAL at 05:34

## 2020-04-06 RX ADMIN — SODIUM CHLORIDE, SODIUM LACTATE, POTASSIUM CHLORIDE, AND CALCIUM CHLORIDE 125 ML/HR: 600; 310; 30; 20 INJECTION, SOLUTION INTRAVENOUS at 06:59

## 2020-04-06 RX ADMIN — OXYTOCIN 4 MILLI-UNITS/MIN: 10 INJECTION, SOLUTION INTRAMUSCULAR; INTRAVENOUS at 03:03

## 2020-04-06 RX ADMIN — FENTANYL CITRATE 100 MCG: 50 INJECTION INTRAMUSCULAR; INTRAVENOUS at 05:29

## 2020-04-06 RX ADMIN — SODIUM CHLORIDE, SODIUM LACTATE, POTASSIUM CHLORIDE, AND CALCIUM CHLORIDE 999 ML/HR: 600; 310; 30; 20 INJECTION, SOLUTION INTRAVENOUS at 06:43

## 2020-04-06 RX ADMIN — VANCOMYCIN HYDROCHLORIDE 1000 MG: 1 INJECTION, POWDER, LYOPHILIZED, FOR SOLUTION INTRAVENOUS at 03:07

## 2020-04-06 RX ADMIN — LIDOCAINE HYDROCHLORIDE,EPINEPHRINE BITARTRATE 2 ML: 15; .005 INJECTION, SOLUTION EPIDURAL; INFILTRATION; INTRACAUDAL; PERINEURAL at 05:31

## 2020-04-06 RX ADMIN — FENTANYL CITRATE 100 MCG: 50 INJECTION, SOLUTION INTRAMUSCULAR; INTRAVENOUS at 05:32

## 2020-04-06 RX ADMIN — SODIUM CHLORIDE, SODIUM LACTATE, POTASSIUM CHLORIDE, AND CALCIUM CHLORIDE 125 ML/HR: 600; 310; 30; 20 INJECTION, SOLUTION INTRAVENOUS at 01:36

## 2020-04-06 RX ADMIN — SODIUM CHLORIDE, SODIUM LACTATE, POTASSIUM CHLORIDE, AND CALCIUM CHLORIDE 125 ML/HR: 600; 310; 30; 20 INJECTION, SOLUTION INTRAVENOUS at 02:30

## 2020-04-06 RX ADMIN — OXYTOCIN 2 MILLI-UNITS/MIN: 10 INJECTION, SOLUTION INTRAMUSCULAR; INTRAVENOUS at 02:30

## 2020-04-06 RX ADMIN — LIDOCAINE HYDROCHLORIDE,EPINEPHRINE BITARTRATE 3 ML: 15; .005 INJECTION, SOLUTION EPIDURAL; INFILTRATION; INTRACAUDAL; PERINEURAL at 05:33

## 2020-04-06 NOTE — ANESTHESIA PREPROCEDURE EVALUATION
Relevant Problems   No relevant active problems       Anesthetic History   No history of anesthetic complications            Review of Systems / Medical History  Patient summary reviewed, nursing notes reviewed and pertinent labs reviewed    Pulmonary            Asthma : well controlled       Neuro/Psych   Within defined limits           Cardiovascular    Hypertension: well controlled                   GI/Hepatic/Renal  Within defined limits              Endo/Other      Hypothyroidism: well controlled       Other Findings              Physical Exam    Airway  Mallampati: II  TM Distance: > 6 cm  Neck ROM: normal range of motion   Mouth opening: Normal     Cardiovascular  Regular rate and rhythm,  S1 and S2 normal,  no murmur, click, rub, or gallop             Dental  No notable dental hx       Pulmonary  Breath sounds clear to auscultation               Abdominal  GI exam deferred       Other Findings            Anesthetic Plan    ASA: 2  Anesthesia type: epidural          Induction: Intravenous  Anesthetic plan and risks discussed with: Patient

## 2020-04-06 NOTE — LACTATION NOTE
This note was copied from a baby's chart. Initial Lactation Consultation - Baby born vaginally this morning to a  mom at 36 2/7 weeks gestation. Mom said she attempted to breast feed her other 2 children but had to stop when she developed high blood pressure 1 week after deliver. She would like to exclusively breast feed this baby but is worried about her blood pressure. She could not remember what medication she took with the other children. I helped mom with a feeding this afternoon. Moms nipples are everted but she said the baby was having difficulty maintaining the latch. I gave mom some tips on positioning and helped her get baby next to her in the cross cradle hold. Baby was showing some feeding cues and after a couple attempts baby was able to get latched. She had a strong suck. We were able to keep her latched for about a minute. Baby would not latch to the second breast. I showed mom hand expression and we were able to express about 6 drops of colostrum to give to the baby. Feeding Plan: Mother will keep baby skin to skin as often as possible, feed on demand, respond to feeding cues, obtain latch, listen for audible swallowing, be aware of signs of oxytocin release/ cramping,thrist,sleepyness while breastfeeding. Mom will not limit the time the baby is at the breast. She will allow the baby to completely finish one breast and then offer the second breast at each feeding. She will call out as needed for assistance. Mom may need to use the nipple shield to get baby to stay latched.

## 2020-04-06 NOTE — ANESTHESIA PROCEDURE NOTES
Epidural Block    Start time: 4/6/2020 5:21 AM  End time: 4/6/2020 5:35 AM  Performed by: Ericka Travis MD  Authorized by: Ericka Travis MD     Pre-Procedure  Indication: labor epidural    Preanesthetic Checklist: patient identified, risks and benefits discussed, anesthesia consent, patient being monitored, timeout performed and anesthesia consent    Timeout Time: 05:21        Epidural:   Patient position:  Left lateral decubitus  Prep region:  Lumbar  Prep: Chlorhexidine    Location:  L2-3    Needle and Epidural Catheter:   Needle Type:  Tuohy  Needle Gauge:  17 G  Injection Technique:  Loss of resistance using air  Attempts:  1  Catheter Size:  19 G  Events: no blood with aspiration, no cerebrospinal fluid with aspiration, no paresthesia and negative aspiration test    Test Dose:  Bupivacaine 0.25% and negative    Assessment:   Catheter Secured:  Tegaderm and tape  Insertion:  Uncomplicated  Patient tolerance:  Patient tolerated the procedure well with no immediate complications

## 2020-04-06 NOTE — PROGRESS NOTES
OBHospitalist Labor Progress Note    Assumed care of patient from ELE Arambula. Patient seen, fetal heart rate and contraction pattern evaluated. Patient tearful, and c/o pain , she desires an epidural @ this time. Physical Exam:    Pt tearful and appears to be in  pain with contractions  Cervical Exam: not examined  Membranes:  Intact  Uterine Activity: Frequency: Irregular  Fetal Heart Rate: Reactive  Accelerations: yes  Decelerations: none  Variability: moderarate  Uterine contractions: irregular    Assessment/Plan:  Reassuring fetal status. Will continue to observe overnight for labor. Patient will get epidural as requested  Pitocin to be restarted after epidural is placed. All questions and concerns addressed.  Patient and spouse verbalized understanding    Melodie Shook MD

## 2020-04-06 NOTE — PROCEDURES
Delivery Note    Obstetrician:  Radha Kyle DO    Assistant: none    Pre-Delivery Diagnosis: Term pregnancy, Induced labor, Single fetus and h/o PP pre-eclampsia with previous 2 children    Post-Delivery Diagnosis: Living  infant(s) and Female    Intrapartum Event: Shoulder dystocia easily released with Phan, gentle downward traction and maternal pushing effort followed by the addition of suprapubic pressure. Total time 15 seconds.      Procedure: Spontaneous vaginal delivery    Epidural: YES    Monitor:  Fetal Heart Tones - External and Uterine Contractions - External    Indications for instrumental delivery: none    Estimated Blood Loss: 200cc    Episiotomy: n/a    Laceration(s):  1st degree    Laceration(s) repair: NO    Presentation: Cephalic    Fetal Description: bush    Fetal Position: Occiput Anterior    Birth Weight: pending    Birth Length: pending    Apgar - One Minute: 8    Apgar - Five Minutes: 9    Umbilical Cord: 3 vessels present  Specimens: placenta (intact and normal appearance)-discarded  Complications:  none           Cord Blood Results:   Information for the patient's :  Harmony Joseph [[id number]]   No results found for: PCTABR, PCTDIG, BILI, ABORH    Prenatal Labs:     Lab Results   Component Value Date/Time    HBsAg, External negative 2019    HIV, External Nonreactive 2019    Rubella, External immune 2019    RPR, External non-reactive 2019    Gonorrhea, External negative 2019    Chlamydia, External negative 2019    GrBStrep, External POSITIVE 03/10/2020        Attending Attestation: I performed the procedure

## 2020-04-06 NOTE — PROGRESS NOTES
CNM Labor Progress Note     Patient: Mt Hines MRN: [id number]  SSN: [id number]    YOB: 1990  Age: 30 y.o. Sex: female        Subjective:   Patient is starting to feel contractions. Would like pain medication. Called to room to discuss options for pain control. Pt does not desire any medication that will make her head feel weird, but also does not desire epidural until very far along in the laboring process. Told by RN that patient earlier in the night (prior to starting pitocin) expressed verbally that she was feeling pressured into an IOL and had initially not wanted to proceed. After a couple hours pt then decided to proceed with epidural.    Objective:   Blood pressure 118/76, pulse 91, temperature 98.4 °F (36.9 °C), resp. rate 18, height 5' (1.524 m), weight 66.7 kg (147 lb), not currently breastfeeding. Fetal heart baseline 155, moderate variability, positive accelerations,  negative decelerations, Uterine contractions q 3-5 minutes, moderate to strong to palpation, resting tone soft,     Sterile Vaginal Exam: deferred, fetal presentation vertex, membranes intact    Pitocin at 4 mu/min    Pt sitting up in bed upon entering room. Assessment:     40w2d  Category 1 fetal heart rate tracing   Elective IOL    Plan:     Upon entering room CNM asked pt how she was doing and how CNM could help. Pt verbalized that she was hurting and wanting something for pain, but that she does not desire any medication that will make her head feel weird, but does not want an epidural before she is very far along. Discussed with patient that epidurals are catheters and hooked to a pump and should last until baby is born, if her last epidural did not work until delivery last time it either needed to be redosed or a new one placed. Offer to check pt cervix if it will help her make a decision (RN had attempted to check prior to calling CNM and patient was unable to tolerate).  Pt would not state she desired cervical exam and stated that this \"Is a [facility]\" because I would not make her have a cervical exam. I stated to patient that she is an active participant in her healthcare and the decisions made during her labor and birth (RN had attempted to check prior to calling CNM and patient was unable to tolerate) and that she can make the decision for an epidural even without a cervical exam if she desires. Discussed that contractions remain every 3 to 5 minutes and for adequate IOL pitocin needs to be increased to a ctx pattern of 2-3 minutes and that we are happy to get her an epidural or pain medication if she desires, but she needs to be the one to decide which (if any option ) she desires. Pt states she just doesn't want to hurt, but does not want an epidural or IV pain medication. I rediscussed options of epidural and IV pain medication and discussed option that this is an elective IOL at this time and we can even turn off pitocin and stop IOL and discuss with Dr. Panfilo Avendano in AM if she prefers. Reiterated that she is not being forced into an IOL (as pt implies second time). At this time patient got agitated and stated \" I am tired of being talked to like a child, can you please leave the room for 5 minutes. \" CNM responded, \"of course\" and upon exiting room pt stated\" Goodbye you fucking bitch\". At this time patient was told care would be handed to Dr. Delisa Arrington for continuation of care. Dr. Delisa Arrington notified of care thus far and interaction. He is happy to assume care at this time.      Dinora Busby CNM

## 2020-04-06 NOTE — PROGRESS NOTES
OBHospitalist Note  Nikos Samuel is a 30 y.o. female [id number] at 40w1d with an EFRAIN of 4/4/20. She presents to labor and delivery for an elective induction of labor. Pt denies LOF, VB, Ctx and endorses good fetal movement.      Pregnancy complicated by history of postpartum pre-e with two prior vaginal deliveries. Both times it occurred at 1 week postpartum. Pt will need early pp appt. Pregnancy also complicated by GBS positive status, Pt PCN allergic, GBS is clinda resistant. Pt also has history of graves disease- not on meds    Notified by CNM of Cat 2 NSt, fetal heart rate and contraction pattern evaluated. Physical Exam:  Cervical Exam: not examined by ME  Membranes:  Intact  Uterine Activity: Frequency: Irregular  Fetal Heart Rate: 170  Accelerations: no  Decelerations: variable  Uterine contractions: irregular  Variability: moderate    Assessment/Plan:  Cat 2 NSt- IVF Bolus to be given by CNM. - she will notify me if CAt 2 Persists.  Despite Intrauterine resuscitation efforts    Cory Rivas MD

## 2020-04-06 NOTE — PROGRESS NOTES
Labor Progress Note  Patient seen, fetal heart rate and contraction pattern evaluated, patient examined. Pt reports that she was very overwhelmed overnight with the induction process as her other children were spontaneous labors. Feeling much better now-can breathe and also comfortable with epidural.   Patient Vitals for the past 1 hrs:   BP Pulse SpO2   04/06/20 0649 (!) 89/53 90    04/06/20 0647 (!) 89/53 (!) 115    04/06/20 0646   92 %   04/06/20 0643 95/55 (!) 117    04/06/20 0640   91 %       Physical Exam:  Cervical Exam:  4 cm dilated    50% effaced    -3 station    Membranes:  Intact  Uterine Activity: Frequency: Every 2-5 minutes (pit off)  Fetal Heart Rate: Reactive  Baseline: 150s per minute  Variability: moderate  Accelerations: yes  Decelerations: none    Assessment/Plan:  Reassuring fetal status, induction of labor s/p cytotec. Will start pitocin and titrate to adequate labor. Expect vaginal delivery.

## 2020-04-06 NOTE — PROGRESS NOTES
6905  Bedside and Verbal shift change report given to KAYLEE Christianson RN (oncoming nurse) by AKASH Qureshi (offgoing nurse). Report included the following information SBAR, Kardex, MAR and Recent Results. 0930  Pt voiding in bed and refusing catheter. Explained importance of emptying bladder with having epidural, yet pt refuses to have catheter. Pt is comfortable with dermatone T8, able to move legs and voided on bedpan 1 tsp.   0945  Dr. Thiago Hansen notified of baseline and pt's refusal of catheter. Will be over to talk with pt.  1050  Pt c/o back pain, placed in knee chest position. 1110  Pt having rectal pressure, sve 8/100/0, Dr. Thiago Hansen called to come. 1120  Dr. Thiago Hansen at bedside, pushing with ucs  12  Live baby girl born vaginally. 1325  Pt voided on bedpan 400cc, eneida care done. 1345  TRANSFER - OUT REPORT:    Verbal report given to ROSA Eckert RN(name) on Ramon Tomlinson  being transferred to MIU(unit) for routine progression of care       Report consisted of patients Situation, Background, Assessment and   Recommendations(SBAR). Information from the following report(s) SBAR, Kardex, [facility] and Recent Results was reviewed with the receiving nurse. Lines:   Peripheral IV 04/05/20 Anterior; Left Hand (Active)   Site Assessment Clean, dry, & intact 4/6/2020  3:44 AM   Phlebitis Assessment 0 4/6/2020  3:44 AM   Infiltration Assessment 0 4/6/2020  3:44 AM   Dressing Status Clean, dry, & intact 4/6/2020  3:44 AM   Dressing Type Tape;Transparent 4/6/2020  3:44 AM   Hub Color/Line Status Pink; Infusing 4/6/2020  3:44 AM        Opportunity for questions and clarification was provided.       Patient transported with:   Registered Nurse

## 2020-04-06 NOTE — PROGRESS NOTES
[facility] Labor Progress Note     Patient: Gerald Gupta MRN: [id number]  SSN: [id number]    YOB: 1990  Age: 30 y.o. Sex: female        Subjective:   Patient coping well with contractions. Reports they are starting to get more intense. Objective:   Blood pressure 126/84, pulse (!) 105, temperature 98.4 °F (36.9 °C), resp. rate 14, height 5' (1.524 m), weight 66.7 kg (147 lb), not currently breastfeeding. Fetal heart baseline 170, moderate variability, positive accelerations,  negative decelerations, Uterine contractions q 3 minutes, mild to moderate to palpation, resting tone soft, Sterile Vaginal Exam:deferred,  fetal presentation vertex, membranes intact     Patient Vitals for the past 4 hrs:   Temp Pulse Resp BP   20 2323 98.4 °F (36.9 °C) (!) 105 14 126/84       Assessment:     40w2d  Category 2 fetal heart rate tracing for fhr baseline 170  Elective IOL  S/p cytotec x1    Plan: Will monitor ctx pattern for another hour, if decrease in intensity at that time will start pitocin, if continue to increase in intensity will continue expectant management.    Dr. Becerril Liter aware of current cat 2 heart rate strip, will give pt bolus   Anticipate     Jacqueline Wakefield CNM

## 2020-04-07 VITALS
WEIGHT: 147 LBS | HEART RATE: 83 BPM | RESPIRATION RATE: 16 BRPM | DIASTOLIC BLOOD PRESSURE: 74 MMHG | HEIGHT: 60 IN | OXYGEN SATURATION: 99 % | TEMPERATURE: 97.6 F | SYSTOLIC BLOOD PRESSURE: 114 MMHG | BODY MASS INDEX: 28.86 KG/M2

## 2020-04-07 PROCEDURE — 74011250637 HC RX REV CODE- 250/637: Performed by: OBSTETRICS & GYNECOLOGY

## 2020-04-07 RX ADMIN — ACETAMINOPHEN 650 MG: 325 TABLET ORAL at 07:20

## 2020-04-07 NOTE — DISCHARGE SUMMARY
Obstetrical Discharge Summary     Name: Leola Pagan MRN: [id number]  SSN: [id number]    YOB: 1990  Age: 30 y.o. Sex: female      Allergies: Pcn [penicillins] and Sulfa (sulfonamide antibiotics)    Admit Date: 2020    Discharge Date: 2020     Admitting Physician: Melba Lawler CNM     Attending Physician:  Garrett Man DO     * Admission Diagnoses: Pregnancy [Z34.90]    * Discharge Diagnoses:   Information for the patient's :  Irina Giron [[id number]]   Delivery of a 3.415 kg female infant via Vaginal, Spontaneous on 2020 at 11:36 AM  by Nathanael Méndez. Apgars were 8  and 9 . Additional Diagnoses:   Hospital Problems as of 2020 Date Reviewed: 3/25/2019          Codes Class Noted - Resolved POA    Pregnancy ICD-10-CM: Z34.90  ICD-9-CM: V22.2  2020 - Present Unknown             Lab Results   Component Value Date/Time    Rubella, External immune 2019    GrBStrep, External POSITIVE 03/10/2020    ABO,Rh A Negative 2019      Immunization History   Administered Date(s) Administered    Influenza Vaccine 2015    Influenza Vaccine (Quad) PF 2016       * Procedures:  on 2020. Girl. * No surgery found *      [place]  Depression Scale  I have been able to laugh and see the funny side of things: As much as I always could  I have looked forward with enjoyment to things: As much as I ever did  I have blamed myself unnecessarily when things went wrong: Not very often  I have been anxious or worried for no good reason: Yes, sometimes  I have felt scared or panicky for no very good reason: Yes, sometimes  Things have been getting on top of me: No, most of the time I have coped quite well  I have been so unhappy that I have had difficulty sleeping: No, not at all  I have felt sad or miserable: No, not at all  I have been so unhappy that I have been crying:  Only occasionally  The thought of harming myself has occurred to me: Never  Total Score: 7    * Discharge Condition: good    * Hospital Course: Normal hospital course following the delivery. * Disposition: Home    Discharge Medications:   Current Discharge Medication List      CONTINUE these medications which have NOT CHANGED    Details   PNV66-Iron Fumarate-FA-DSS-DHA 26-1.2- mg cap Take  by mouth. STOP taking these medications       iron, carbonyl (FEOSOL) 45 mg tab Comments:   Reason for Stopping:         aspirin 81 mg chewable tablet Comments:   Reason for Stopping:         PSYLLIUM SEED, WITH SUGAR, (FIBER SUPPLEMENT PO) Comments:   Reason for Stopping:               * Follow-up Care/Patient Instructions:   Activity: no sex, douching, tampons, bath/pool x 6 weeks  Diet: Regular Diet  Wound Care: None needed    Follow-up Information     Follow up With Specialties Details Why Contact Info    Rodger Ormond, MD [address]       Adeel Rashid, [address] [facility], Gynecology, Obstetrics In 1 week  [address] [address]  [phone]

## 2020-04-07 NOTE — DISCHARGE INSTRUCTIONS
POSTPARTUM DISCHARGE INSTRUCTIONS       Name:  Rosa Chávez  YOB: 1990  Admission Diagnosis:  Pregnancy [Z34.90]     Discharge Diagnosis:    Problem List as of 4/7/2020 Date Reviewed: 3/25/2019          Codes Class Noted - Resolved    Postpartum hypertension ICD-10-CM: O16.5  ICD-9-CM: 642.94 Acute 12/31/2015 - Present        Hypertensive encephalopathy ICD-10-CM: I67.4  ICD-9-CM: 437.2 Present on Admission 12/31/2015 - Present        Pregnancy ICD-10-CM: Z34.90  ICD-9-CM: V22.2  4/5/2020 - Present        Graves disease ICD-10-CM: E05.00  ICD-9-CM: 242.00  5/26/2016 - Present            Attending Physician:  Manuel Miguel, DO    Delivery Type:  Vaginal Childbirth: What To Expect At Home    Your Recovery: Your body will slowly heal in the next few weeks. It is easy to get too tired and overwhelmed during the first weeks after your baby is born. Changes in your hormones can shift your mood without warning. You may find it hard to meet the extra demands on your energy and time. Take it easy on yourself. Follow-up care is a key part of your treatment and safety. Be sure to make and go to all appointments, and call your doctor if you are having problems. It's also a good idea to know your test results and keep a list of the medicines you take. How can you care for yourself at home? Vaginal bleeding and cramps  · After delivery, you will have a bloody discharge from the vagina. This will turn pink within a week and then white or yellow after about 10 days. It may last for 2 to 4 weeks or longer, until the uterus has healed. Use pads instead of tampons until you stop bleeding. · Do not worry if you pass some blood clots, as long as they are smaller than a golf ball. If you have a tear or stitches in your vaginal area, change the pad at least every 4 hours to prevent soreness and infection. · You may have cramps for the first few days after childbirth.  These are normal and occur as the uterus shrinks to normal size. Take an over-the-counter pain medicine, such as acetaminophen (Tylenol), ibuprofen (Advil, Motrin), or naproxen (Aleve), for cramps. Read and follow all instructions on the label. Do not take aspirin, because it can cause more bleeding. Do not take acetaminophen (Tylenol) and other acetaminophen containing medications (i.e. Percocet) at the same time. Breast fullness  · Your breasts may overfill (engorge) in the first few days after delivery. To help milk flow and to relieve pain, warm your breasts in the shower or by using warm, moist towels before nursing. · If you are not nursing, do not put warmth on your breasts or touch your breasts. Wear a tight bra or sports bra and use ice until the fullness goes away. This usually takes 2 to 3 days. · Put ice or a cold pack on your breast after nursing to reduce swelling and pain. Put a thin cloth between the ice and your skin. Activity  · Eat a balanced diet. Do not try to lose weight by cutting calories. Keep taking your prenatal vitamins, or take a multivitamin. · Get as much rest as you can. Try to take naps when your baby sleeps during the day. · Get some exercise every day. But do not do any heavy exercise until your doctor says it is okay. · Wait until you are healed (about 4 to 6 weeks) before you have sexual intercourse. Your doctor will tell you when it is okay to have sex. · Talk to your doctor about birth control. You can get pregnant even before your period returns. Also, you can get pregnant while you are breast-feeding. Mental Health  · Many women get the \"baby blues\" during the first few days after childbirth. You may lose sleep, feel irritable, and cry easily. You may feel happy one minute and sad the next. Hormone changes are one cause of these emotional changes. Also, the demands of a new baby, along with visits from relatives or other family needs, add to a mother's stress.  The \"baby blues\" often peak around the fourth day. Then they ease up in less than 2 weeks. · If your moodiness or anxiety lasts for more than 2 weeks, or if you feel like life is not worth living, you may have postpartum depression. This is different for each mother. Some mothers with serious depression may worry intensely about their infant's well-being. Others may feel distant from their child. Some mothers might even feel that they might harm their baby. A mother may have signs of paranoia, wondering if someone is watching her. · With all the changes in your life, you may not know if you are depressed. Pregnancy sometimes causes changes in how you feel that are similar to the symptoms of depression. · Symptoms of depression include:  · Feeling sad or hopeless and losing interest in daily activities. These are the most common symptoms of depression. · Sleeping too much or not enough. · Feeling tired. You may feel as if you have no energy. · Eating too much or too little. · [employer] ([employer]I) offers a Warm line; Chat with the Expert phone sessions; Information and Articles about Pregnancy and Postpartum Mood Disorders; Comprehensive List of Free Support Groups; Knowledgeable local coordinators who will offer support, information, and resources; Guide to Resources on [employer]; Calendar of events in the  mood disorders community; Latest News and Research; and [facility] [address] for [employer]. Remember - You are not alone; You are not to blame; With help, you will be well. 1-020-896-PPD(5749). WWW. POSTPARTUM. NET   · Writing or talking about death, such as writing suicide notes or talking about guns, knives, or pills. Keep the numbers for these national suicide hotlines: 1-933-935-TALK (1-404-767-2509) and 7-399-AWENLPJ (5-460.428.6542). If you or someone you know talks about suicide or feeling hopeless, get help right away.     Constipation and Hemorrhoids  · Drink plenty of fluids, enough so that your urine is light yellow or clear like water. If you have kidney, heart, or liver disease and have to limit fluids, talk with your doctor before you increase the amount of fluids you drink. · Eat plenty of fiber each day. Have a bran muffin or bran cereal for breakfast, and try eating a piece of fruit for a mid-afternoon snack. · For painful, itchy hemorrhoids, put ice or a cold pack on the area several times a day for 10 minutes at a time. Follow this by putting a warm compress on the area for another 10 to 20 minutes or by sitting in a shallow, warm bath. When should you call for help? Call 911 anytime you think you may need emergency care. For example, call if:  · You are thinking of hurting yourself, your baby, or anyone else. · You passed out (lost consciousness). · You have symptoms of a blood clot in your lung (called a pulmonary embolism). These may include:    · Sudden chest pain. · Trouble breathing. · Coughing up blood. Call your doctor now or seek immediate medical care if:  · You have severe vaginal bleeding. · You are soaking through a pad each hour for 2 or more hours. · Your vaginal bleeding seems to be getting heavier or is still bright red 4 days after delivery. · You are dizzy or lightheaded, or you feel like you may faint. · You are vomiting or cannot keep fluids down. · You have a fever. · You have new or more belly pain. · You pass tissue (not just blood). · Your vaginal discharge smells bad. · Your belly feels tender or full and hard. · Your breasts are continuously painful or red. · You feel sad, anxious, or hopeless for more than a few days. · You have sudden, severe pain in your belly. · You have symptoms of a blood clot in your leg (called a deep vein thrombosis),          such as:  · Pain in your calf, back of the knee, thigh, or groin. · Redness and swelling in your leg or groin.   · You have symptoms of preeclampsia, such as:  · Sudden swelling of your face, hands, or feet. · New vision problems (such as dimness or blurring). · A severe headache. · Your blood pressure is higher than it should be or rises suddenly. · You have new nausea or vomiting. Watch closely for changes in your health, and be sure to contact your doctor if you have any problems. Additional Information:  Learning About Hypertensive Disorders After Childbirth    What is preeclampsia? A woman with preeclampsia has blood pressure that is higher than usual. She may also have other serious symptoms. Preeclampsia can be dangerous. When it is severe, it can cause seizures (eclampsia) or liver or kidney damage. When the liver is affected, some women get HELLP syndrome, a blood-clotting and bleeding problem. HELLP can come on quickly and can be deadly. This is why your doctor checks you and your baby often. Preeclampsia usually occurs after 20 weeks of pregnancy. In rare cases, it is first noted right after childbirth. Most often, it starts near the end of pregnancy and goes away after childbirth. What are the symptoms? Mild preeclampsia usually doesn't cause symptoms. But preeclampsia can cause rapid weight gain and sudden swelling of the hands and face. Severe preeclampsia does cause symptoms. It can cause a very bad headache and trouble seeing and breathing. It also can cause belly pain. You may also urinate less than usual.    If you have new preeclampsia symptoms after you go home from the hospital, call your doctor right away. What can you expect after you have had preeclampsia? In the hospital  After the baby and the placenta are delivered, preeclampsia usually starts to improve. Most women get better in the first few days after childbirth. After having preeclampsia, you still have a risk of seizures for a day or more after childbirth. (Very rarely, seizures happen later on.) So your doctor may have you take magnesium sulfate for a day or more to prevent seizures. You may also take medicine to lower your blood pressure. When you go home  Your blood pressure will most likely return to normal a few days after delivery. Your doctor will want to check your blood pressure sometime in the first week after you leave the hospital.    Some women still have high blood pressure 6 weeks after childbirth. But most return to normal levels over the long term. · Take and record your blood pressure at home if your doctor tells you to. · Learn the importance of the two measures of blood pressure (such as 120 over 80, or 120/80). The first number is the systolic pressure. This is the force of blood on the artery walls as the heart pumps. The second number is the diastolic pressure. This is the force of blood on the artery walls between heartbeats, when the heart is at rest. You have a choice of monitors to use. Manual monitor: You pump up the cuff and use a stethoscope to listen for your  Pulse. · Electronic monitor: The cuff inflates, and a gauge shows your pulse rate. · To take your blood pressure:  · Ask your doctor to check your blood pressure monitor to be sure that it is accurate and that the cuff fits you. Also ask your doctor to watch you use it, to make sure that you are using it right. · You should not eat, use tobacco products, or use medicine known to raise blood pressure (such as some nasal decongestant sprays) before you take your blood pressure. · Avoid taking your blood pressure if you have just exercised or are nervous or upset. Rest at least 15 minutes before you take your blood pressure. · Be safe with medicines. If you take medicine, take it exactly as prescribed. Call your doctor if you think you are having a problem with your medicine. · Do not smoke. Quitting smoking will help lower your blood pressure and improve your baby's growth and health. If you need help quitting, talk to your doctor about stop-smoking programs and medicines.  These can increase your chances of quitting for good. · Eat a balanced and healthy diet that has lots of fruits and vegetables. Long-term health   After you have had preeclampsia, you have a higher-than-average risk of heart disease, stroke, and kidney disease. This may be because the same things that cause preeclampsia also cause heart and kidney disease. To protect your health, work with your doctor on living a heart-healthy lifestyle and getting the checkups you need. Your doctor may also want you to check your blood pressure at home. Follow-up care is a key part of your treatment and safety. Be sure to make and go to all appointments, and call your doctor if you are having problems. It's also a good idea to know your test results and keep a list of the medicines you take. Preventing Infection at Home    We care about preventing infection and avoiding the spread of germs - not only when you are in the hospital but also when you return home. When you return home from the hospital, it's important to take the following steps to help prevent infection and avoid spreading germs that could infect you and others. Ask everyone in your home to follow these guidelines, too. Clean Your Hands  · Clean your hands whenever your hands are visibly dirty, before you eat, before or after touching your mouth, nose or eyes, and before preparing food. Clean them after contact with body fluids, using the restroom, touching animals or changing diapers. · When washing hands, wet them with warm water and work up a lather. Rub hands for at least 15 seconds, then rinse them and pat them dry with a clean towel or paper towel. · When using hand sanitizers, it should take about 15 seconds to rub your hands dry. If not, you probably didn't apply enough . Cover Your Sneeze or Cough  Germs are released into the air whenever you sneeze or cough.  To prevent the spread of infection:  · Turn away from other people before coughing or sneezing. · Cover your mouth or nose with a tissue when you cough or sneeze. Put the tissue in the trash. · If you don't have a tissue, cough or sneeze into your upper sleeve, not your hands. · Always clean your hands after coughing or sneezing. Care for Wounds  Your skin is your body's first line of defense against germs, but an open wound leaves an easy way for germs to enter your body. To prevent infection:  · Clean your hands before and after changing wound dressings, and wear gloves to change dressings if recommended by your doctor. · Take special care with IV lines or other devices inserted into the body. If you must touch them, clean your hands first.  · Follow any specific instructions from your doctor to care for your wounds. Contact your doctor if you experience any signs of infection, such as fever or increased redness at the surgical or wound site. Keep a Clean Home  · Clean or wipe commonly touched hard surfaces like door handles, sinks, tabletops, phones and TV remotes. · Use products labeled \"disinfectant\" to kill harmful bacteria and viruses. · Use a clean cloth or paper towel to clean and dry surfaces. Wiping surfaces with a dirty dishcloth, sponge or towel will only spread germs. · Never share toothbrushes, mendoza, drinking glasses, utensils, razor blades, face cloths or bath towels to avoid spreading germs. · Be sure that the linens that you sleep on are clean. · Keep pets away from wounds and wash your hands after touching pets, their toys or bedding. We care about you and your health. Remember, preventing infections is a   team effort between you, your family, friends and health care providers. Postpartum Support    PARENTS:  Are you feeling sad or depressed? Is it difficult for you to enjoy yourself? Do you feel more irritable or tense? Do you feel anxious or panicky? Are you having difficulty bonding with your baby?  Do you feel as if you are \"out of control\" or \"going crazy\"? Are you worried that you might hurt your baby or yourself? FAMILIES: Do you worry that something is wrong but don't know how to help? Do you think that your partner or spouse is having problems coping? Are you worried that it may never get better? While many women experience some mild mood change or \"the blues\" during or after the birth of a child, 1 in 9 women experience more significant symptoms of depression or anxiety. 1 in 10 Dads become depressed during the first year. Things you can do  Being a good parent includes taking care of yourself. If you take care of yourself, you will be able to take better care of your baby and your family. · Talk to a counselor or healthcare provider who has training in  mood and anxiety problems. · Learn as much as you can about pregnancy and postpartum depression and anxiety. · Get support from family and friends. Ask for help when you need it. · Join a support group in your area or online. · Keep active by walking, stretching or whatever form of exercise helps you to feel better. · Get enough rest and time for yourself. · Eat a healthy diet. · Don't give up! It may take more than one try to get the right help you need. These are general instructions for a healthy lifestyle:    No smoking/ No tobacco products/ Avoid exposure to second hand smoke    Surgeon General's Warning:  Quitting smoking now greatly reduces serious risk to your health. Obesity, smoking, and sedentary lifestyle greatly increases your risk for illness    A healthy diet, regular physical exercise & weight monitoring are important for maintaining a healthy lifestyle    Recognize signs and symptoms of STROKE:    F-face looks uneven    A-arms unable to move or move unevenly    S-speech slurred or non-existent    T-time-call 911 as soon as signs and symptoms begin - DO NOT go       back to bed or wait to see if you get better - TIME IS BRAIN.       I have had the opportunity to make my options or choices for discharge. I have received and understand these instructions. Yes

## 2020-04-07 NOTE — PROGRESS NOTES
Post-Partum Day Number 1 Progress Note    Patient doing well post-partum without significant complaint. Voiding without difficulty, normal lochia. Denies cp/sob/n/v. Ambulating without problem. Thinking about possible discharge this afternoon. Anxious about risk of PP pre-eclampsia. Vitals:    Patient Vitals for the past 8 hrs:   BP Temp Pulse Resp   20 0300 138/83 97.6 °F (36.4 °C) 80 16     Temp (24hrs), Av.1 °F (36.7 °C), Min:97.6 °F (36.4 °C), Max:98.4 °F (36.9 °C)      Vital signs stable, afebrile. Exam:  Patient without distress. Heart regular rate and rhythm   Lungs CTA b/l               Abdomen soft, fundus firm at level of umbilicus, nontender               Lower extremities are negative for swelling, cords or tenderness. Lab/Data Review:  no new labs    Assessment and Plan:  Patient appears to be having uncomplicated post-partum course. Continue routine perineal care and maternal education. Plan discharge this evening if baby discharged and mom ready to go home. H/o PP pre-eclampsia with previous 2 children. Pt to see me by telehealth visit on Monday. Has a bp cuff at home to check bp 1-2 times per day. Rh negative-baby also rh negative. Rubella immune. Girl.

## 2020-04-07 NOTE — LACTATION NOTE
This note was copied from a baby's chart. Mom has decided to bottle feed baby due to \"too stressful and nipples are too sore\". She bottle fed her other 2 children. I offered support to continue breastfeeding but Mom did not want to discuss it. I educated her on what to do to manage engorgement when her milk comes in. She says she did not have any issues with engorgement with her other children. Mom will likely be discharged today if the baby's bilirubin level is WNL. I encouraged her to call with any concerns or questions.

## 2020-06-03 DIAGNOSIS — R07.9 CHEST PAIN, UNSPECIFIED TYPE: Primary | ICD-10-CM

## 2020-07-03 DIAGNOSIS — B00.1 HERPES LABIALIS: Primary | ICD-10-CM

## 2020-07-03 RX ORDER — ACYCLOVIR 50 MG/G
OINTMENT TOPICAL
Qty: 2 G | Refills: 5 | Status: SHIPPED | OUTPATIENT
Start: 2020-07-03 | End: 2020-12-01

## 2020-07-03 RX ORDER — VALACYCLOVIR HYDROCHLORIDE 1 G/1
TABLET, FILM COATED ORAL
Qty: 6 TAB | Refills: 1 | Status: SHIPPED | OUTPATIENT
Start: 2020-07-03 | End: 2020-12-01

## 2020-09-25 ENCOUNTER — APPOINTMENT (OUTPATIENT)
Dept: CT IMAGING | Age: 30
End: 2020-09-25
Attending: EMERGENCY MEDICINE
Payer: COMMERCIAL

## 2020-09-25 ENCOUNTER — HOSPITAL ENCOUNTER (EMERGENCY)
Age: 30
Discharge: HOME | End: 2020-09-25
Attending: EMERGENCY MEDICINE | Admitting: EMERGENCY MEDICINE
Payer: COMMERCIAL

## 2020-09-25 VITALS
SYSTOLIC BLOOD PRESSURE: 131 MMHG | DIASTOLIC BLOOD PRESSURE: 93 MMHG | BODY MASS INDEX: 23.42 KG/M2 | RESPIRATION RATE: 16 BRPM | OXYGEN SATURATION: 100 % | TEMPERATURE: 97.8 F | HEIGHT: 60 IN | HEART RATE: 113 BPM | WEIGHT: 119.27 LBS

## 2020-09-25 DIAGNOSIS — N23 URETERAL COLIC: ICD-10-CM

## 2020-09-25 DIAGNOSIS — N20.1 LEFT URETERAL STONE: Primary | ICD-10-CM

## 2020-09-25 DIAGNOSIS — R11.2 NON-INTRACTABLE VOMITING WITH NAUSEA, UNSPECIFIED VOMITING TYPE: ICD-10-CM

## 2020-09-25 LAB
ALBUMIN SERPL-MCNC: 4.3 G/DL (ref 3.5–5)
ALBUMIN/GLOB SERPL: 1.3 {RATIO} (ref 1.1–2.2)
ALP SERPL-CCNC: 80 U/L (ref 45–117)
ALT SERPL-CCNC: 20 U/L (ref 12–78)
ANION GAP SERPL CALC-SCNC: 8 MMOL/L (ref 5–15)
APPEARANCE UR: CLEAR
AST SERPL-CCNC: 14 U/L (ref 15–37)
BACTERIA URNS QL MICRO: NEGATIVE /HPF
BASOPHILS # BLD: 0.1 K/UL (ref 0–0.1)
BASOPHILS NFR BLD: 0 % (ref 0–1)
BILIRUB SERPL-MCNC: 0.7 MG/DL (ref 0.2–1)
BILIRUB UR QL: NEGATIVE
BUN SERPL-MCNC: 17 MG/DL (ref 6–20)
BUN/CREAT SERPL: 20 (ref 12–20)
CALCIUM SERPL-MCNC: 9.8 MG/DL (ref 8.5–10.1)
CHLORIDE SERPL-SCNC: 109 MMOL/L (ref 97–108)
CO2 SERPL-SCNC: 22 MMOL/L (ref 21–32)
COLOR UR: ABNORMAL
CREAT SERPL-MCNC: 0.85 MG/DL (ref 0.55–1.02)
DIFFERENTIAL METHOD BLD: ABNORMAL
EOSINOPHIL # BLD: 0.2 K/UL (ref 0–0.4)
EOSINOPHIL NFR BLD: 2 % (ref 0–7)
EPITH CASTS URNS QL MICRO: ABNORMAL /LPF
ERYTHROCYTE [DISTWIDTH] IN BLOOD BY AUTOMATED COUNT: 13.1 % (ref 11.5–14.5)
GLOBULIN SER CALC-MCNC: 3.3 G/DL (ref 2–4)
GLUCOSE SERPL-MCNC: 123 MG/DL (ref 65–100)
GLUCOSE UR STRIP.AUTO-MCNC: NEGATIVE MG/DL
HCG UR QL: NEGATIVE
HCT VFR BLD AUTO: 40.5 % (ref 35–47)
HGB BLD-MCNC: 13.9 G/DL (ref 11.5–16)
HGB UR QL STRIP: ABNORMAL
HYALINE CASTS URNS QL MICRO: ABNORMAL /LPF (ref 0–5)
IMM GRANULOCYTES # BLD AUTO: 0.1 K/UL (ref 0–0.04)
IMM GRANULOCYTES NFR BLD AUTO: 1 % (ref 0–0.5)
KETONES UR QL STRIP.AUTO: NEGATIVE MG/DL
LEUKOCYTE ESTERASE UR QL STRIP.AUTO: NEGATIVE
LYMPHOCYTES # BLD: 4.1 K/UL (ref 0.8–3.5)
LYMPHOCYTES NFR BLD: 36 % (ref 12–49)
MCH RBC QN AUTO: 27.9 PG (ref 26–34)
MCHC RBC AUTO-ENTMCNC: 34.3 G/DL (ref 30–36.5)
MCV RBC AUTO: 81.3 FL (ref 80–99)
MONOCYTES # BLD: 0.9 K/UL (ref 0–1)
MONOCYTES NFR BLD: 7 % (ref 5–13)
NEUTS SEG # BLD: 6.3 K/UL (ref 1.8–8)
NEUTS SEG NFR BLD: 54 % (ref 32–75)
NITRITE UR QL STRIP.AUTO: NEGATIVE
NRBC # BLD: 0 K/UL (ref 0–0.01)
NRBC BLD-RTO: 0 PER 100 WBC
PH UR STRIP: 7 [PH] (ref 5–8)
PLATELET # BLD AUTO: 358 K/UL (ref 150–400)
PMV BLD AUTO: 9.5 FL (ref 8.9–12.9)
POTASSIUM SERPL-SCNC: 3.2 MMOL/L (ref 3.5–5.1)
PROT SERPL-MCNC: 7.6 G/DL (ref 6.4–8.2)
PROT UR STRIP-MCNC: NEGATIVE MG/DL
RBC # BLD AUTO: 4.98 M/UL (ref 3.8–5.2)
RBC #/AREA URNS HPF: ABNORMAL /HPF (ref 0–5)
SODIUM SERPL-SCNC: 139 MMOL/L (ref 136–145)
SP GR UR REFRACTOMETRY: 1.02 (ref 1–1.03)
UA: UC IF INDICATED,UAUC: ABNORMAL
UROBILINOGEN UR QL STRIP.AUTO: 0.2 EU/DL (ref 0.2–1)
WBC # BLD AUTO: 11.5 K/UL (ref 3.6–11)
WBC URNS QL MICRO: ABNORMAL /HPF (ref 0–4)

## 2020-09-25 PROCEDURE — 81001 URINALYSIS AUTO W/SCOPE: CPT

## 2020-09-25 PROCEDURE — 74176 CT ABD & PELVIS W/O CONTRAST: CPT

## 2020-09-25 PROCEDURE — 80053 COMPREHEN METABOLIC PANEL: CPT

## 2020-09-25 PROCEDURE — 85025 COMPLETE CBC W/AUTO DIFF WBC: CPT

## 2020-09-25 PROCEDURE — 36415 COLL VENOUS BLD VENIPUNCTURE: CPT

## 2020-09-25 PROCEDURE — 74011250636 HC RX REV CODE- 250/636: Performed by: EMERGENCY MEDICINE

## 2020-09-25 PROCEDURE — 81025 URINE PREGNANCY TEST: CPT

## 2020-09-25 PROCEDURE — 96375 TX/PRO/DX INJ NEW DRUG ADDON: CPT

## 2020-09-25 PROCEDURE — 96376 TX/PRO/DX INJ SAME DRUG ADON: CPT

## 2020-09-25 PROCEDURE — 96361 HYDRATE IV INFUSION ADD-ON: CPT

## 2020-09-25 PROCEDURE — 99283 EMERGENCY DEPT VISIT LOW MDM: CPT

## 2020-09-25 PROCEDURE — 96374 THER/PROPH/DIAG INJ IV PUSH: CPT

## 2020-09-25 RX ORDER — KETOROLAC TROMETHAMINE 10 MG/1
10 TABLET, FILM COATED ORAL
Qty: 10 TAB | Refills: 0 | Status: SHIPPED | OUTPATIENT
Start: 2020-09-25 | End: 2020-12-01

## 2020-09-25 RX ORDER — KETOROLAC TROMETHAMINE 30 MG/ML
15 INJECTION, SOLUTION INTRAMUSCULAR; INTRAVENOUS
Status: COMPLETED | OUTPATIENT
Start: 2020-09-25 | End: 2020-09-25

## 2020-09-25 RX ORDER — ONDANSETRON 4 MG/1
4 TABLET, ORALLY DISINTEGRATING ORAL
Qty: 10 TAB | Refills: 0 | Status: SHIPPED | OUTPATIENT
Start: 2020-09-25 | End: 2020-12-01

## 2020-09-25 RX ORDER — ONDANSETRON 2 MG/ML
4 INJECTION INTRAMUSCULAR; INTRAVENOUS
Status: DISCONTINUED | OUTPATIENT
Start: 2020-09-25 | End: 2020-09-25 | Stop reason: HOSPADM

## 2020-09-25 RX ORDER — PROMETHAZINE HYDROCHLORIDE 25 MG/1
25 SUPPOSITORY RECTAL
Qty: 12 SUPPOSITORY | Refills: 0 | Status: SHIPPED | OUTPATIENT
Start: 2020-09-25 | End: 2020-12-01

## 2020-09-25 RX ORDER — ONDANSETRON 2 MG/ML
4 INJECTION INTRAMUSCULAR; INTRAVENOUS
Status: COMPLETED | OUTPATIENT
Start: 2020-09-25 | End: 2020-09-25

## 2020-09-25 RX ORDER — TAMSULOSIN HYDROCHLORIDE 0.4 MG/1
0.4 CAPSULE ORAL DAILY
Qty: 7 CAP | Refills: 0 | Status: SHIPPED | OUTPATIENT
Start: 2020-09-25 | End: 2020-10-02

## 2020-09-25 RX ADMIN — KETOROLAC TROMETHAMINE 15 MG: 30 INJECTION, SOLUTION INTRAMUSCULAR at 04:23

## 2020-09-25 RX ADMIN — ONDANSETRON 4 MG: 2 INJECTION INTRAMUSCULAR; INTRAVENOUS at 05:45

## 2020-09-25 RX ADMIN — SODIUM CHLORIDE 1000 ML: 900 INJECTION, SOLUTION INTRAVENOUS at 04:24

## 2020-09-25 RX ADMIN — ONDANSETRON 4 MG: 2 INJECTION INTRAMUSCULAR; INTRAVENOUS at 04:23

## 2020-09-25 NOTE — ED PROVIDER NOTES
EMERGENCY DEPARTMENT HISTORY AND PHYSICAL EXAM     ------------------------------------------------------------------------------------------------------  Please note that this dictation was completed with [employer], the [employer] voice recognition software. Quite often unanticipated grammatical, syntax, homophones, and other interpretive errors are inadvertently transcribed by the computer software. Please disregard these errors. Please excuse any errors that have escaped final proofreading.  -----------------------------------------------------------------------------------------------------------------    Date: 9/25/2020  Patient Name: Elmo Castillo    History of Presenting Illness     Chief Complaint   Patient presents with    Flank Pain     L flank/L lower back pain x 1 hour    Vomiting       History Provided By: Patient    HPI: Elmo Castillo is a 35 y.o. female, without significant pmhx, who presents via private vehicle to the ED with c/o sudden onset of left-sided flank pain that started approximately 1 hour ago. Patient notes sharp stabbing pain radiating from her left back to her front. Notes feeling that her urethra was \"tight\" for the last several days. No fever. Having associated nausea and vomiting without diarrhea. Pt also specifically denies any recent fevers, chills, CP, SOB, nausea, vomiting, diarrhea,  changes in BM,  or headache. PCP: Smitha Johnson MD    Social Hx: denies tobacco, denies EtOH, denies Illicit Drugs     There are no other complaints, changes, or physical findings at this time.      Allergies   Allergen Reactions    Pcn [Penicillins] Unknown (comments)    Sulfa (Sulfonamide Antibiotics) Rash     A.s child         Current Facility-Administered Medications   Medication Dose Route Frequency Provider Last Rate Last Dose    ondansetron (ZOFRAN) injection 4 mg  4 mg IntraVENous Q1H PRN Lula Bryson MD   4 mg at 09/25/20 0413     Current Outpatient Medications Medication Sig Dispense Refill    ondansetron (Zofran ODT) 4 mg disintegrating tablet Take 1 Tab by mouth every eight (8) hours as needed for Nausea. 10 Tab 0    promethazine (PHENERGAN) 25 mg suppository Insert 1 Suppository into rectum every six (6) hours as needed for Nausea. 12 Suppository 0    ketorolac (TORADOL) 10 mg tablet Take 1 Tab by mouth every six (6) hours as needed for Pain. 10 Tab 0    tamsulosin (Flomax) 0.4 mg capsule Take 1 Cap by mouth daily for 7 doses. 7 Cap 0    valACYclovir (VALTREX) 1 gram tablet 1 tab twice daily for 1 day 6 Tab 1    acyclovir (ZOVIRAX) 5 % ointment Apply  to affected area every three (3) hours. 2 g 5    PNV66-Iron Fumarate-FA-DSS-DHA 26-1.2- mg cap Take  by mouth. Past History     Past Medical History:  Past Medical History:   Diagnosis Date    Abnormal Papanicolaou smear of cervix     colpo normal    Anemia     Asthma     Complication of anesthesia     \"low tolerance\"    Miscarriage     PCOS (polycystic ovarian syndrome)     Polycystic disease, ovaries     questionable    Postpartum hypertension 12/31/2015    also 2017    Psychiatric problem     depression/ anxiety    Thyroid activity decreased        Past Surgical History:  Past Surgical History:   Procedure Laterality Date    HX GYN      ectopic pregnancy    HX HEENT      wisdom teeth extraction    HX OTHER SURGICAL         Family History:  Family History   Problem Relation Age of Onset    Thyroid Disease Maternal Grandmother         hypothyroidism    Diabetes Father     Heart Disease Father        Social History:  Social History     Tobacco Use    Smoking status: Never Smoker    Smokeless tobacco: Never Used   Substance Use Topics    Alcohol use: No    Drug use: No       Allergies:   Allergies   Allergen Reactions    Pcn [Penicillins] Unknown (comments)    Sulfa (Sulfonamide Antibiotics) Rash     A.s child         Review of Systems   Review of Systems   Constitutional: Negative. Negative for fever. Eyes: Negative. Respiratory: Negative. Negative for shortness of breath. Cardiovascular: Negative for chest pain. Gastrointestinal: Positive for abdominal pain, nausea and vomiting. Endocrine: Negative. Genitourinary: Positive for difficulty urinating, dysuria and flank pain. Negative for hematuria. Skin: Negative. Neurological: Negative. Psychiatric/Behavioral: Negative for suicidal ideas. All other systems reviewed and are negative. Physical Exam   Physical Exam  Vitals signs and nursing note reviewed. Constitutional:       General: She is not in acute distress. Appearance: She is well-developed. She is not diaphoretic. HENT:      Head: Normocephalic and atraumatic. Nose: Nose normal.   Eyes:      General: No scleral icterus. Conjunctiva/sclera: Conjunctivae normal.   Neck:      Musculoskeletal: Normal range of motion. Trachea: No tracheal deviation. Cardiovascular:      Rate and Rhythm: Normal rate and regular rhythm. Heart sounds: Normal heart sounds. No murmur. No friction rub. Pulmonary:      Effort: Pulmonary effort is normal. Tachypnea (Patient is hyperventilating) present. Breath sounds: Normal breath sounds. No stridor. No wheezing or rales. Abdominal:      General: Bowel sounds are normal. There is no distension. Palpations: Abdomen is soft. Tenderness: There is abdominal tenderness in the left lower quadrant. There is left CVA tenderness. There is no rebound. Musculoskeletal: Normal range of motion. General: No tenderness. Skin:     General: Skin is warm and dry. Findings: No rash. Neurological:      Mental Status: She is alert and oriented to person, place, and time. Cranial Nerves: No cranial nerve deficit. Psychiatric:         Speech: Speech normal.         Behavior: Behavior normal.         Thought Content:  Thought content normal.         Judgment: Judgment normal.           Diagnostic Study Results     Labs -     Recent Results (from the past 12 hour(s))   CBC WITH AUTOMATED DIFF    Collection Time: 09/25/20  4:17 AM   Result Value Ref Range    WBC 11.5 (H) 3.6 - 11.0 K/uL    RBC 4.98 3.80 - 5.20 M/uL    HGB 13.9 11.5 - 16.0 g/dL    HCT 40.5 35.0 - 47.0 %    MCV 81.3 80.0 - 99.0 FL    MCH 27.9 26.0 - 34.0 PG    MCHC 34.3 30.0 - 36.5 g/dL    RDW 13.1 11.5 - 14.5 %    PLATELET 368 157 - 313 K/uL    MPV 9.5 8.9 - 12.9 FL    NRBC 0.0 0  WBC    ABSOLUTE NRBC 0.00 0.00 - 0.01 K/uL    NEUTROPHILS 54 32 - 75 %    LYMPHOCYTES 36 12 - 49 %    MONOCYTES 7 5 - 13 %    EOSINOPHILS 2 0 - 7 %    BASOPHILS 0 0 - 1 %    IMMATURE GRANULOCYTES 1 (H) 0.0 - 0.5 %    ABS. NEUTROPHILS 6.3 1.8 - 8.0 K/UL    ABS. LYMPHOCYTES 4.1 (H) 0.8 - 3.5 K/UL    ABS. MONOCYTES 0.9 0.0 - 1.0 K/UL    ABS. EOSINOPHILS 0.2 0.0 - 0.4 K/UL    ABS. BASOPHILS 0.1 0.0 - 0.1 K/UL    ABS. IMM. GRANS. 0.1 (H) 0.00 - 0.04 K/UL    DF AUTOMATED     METABOLIC PANEL, COMPREHENSIVE    Collection Time: 09/25/20  4:17 AM   Result Value Ref Range    Sodium 139 136 - 145 mmol/L    Potassium 3.2 (L) 3.5 - 5.1 mmol/L    Chloride 109 (H) 97 - 108 mmol/L    CO2 22 21 - 32 mmol/L    Anion gap 8 5 - 15 mmol/L    Glucose 123 (H) 65 - 100 mg/dL    BUN 17 6 - 20 MG/DL    Creatinine 0.85 0.55 - 1.02 MG/DL    BUN/Creatinine ratio 20 12 - 20      GFR est AA >60 >60 ml/min/1.73m2    GFR est non-AA >60 >60 ml/min/1.73m2    Calcium 9.8 8.5 - 10.1 MG/DL    Bilirubin, total 0.7 0.2 - 1.0 MG/DL    ALT (SGPT) 20 12 - 78 U/L    AST (SGOT) 14 (L) 15 - 37 U/L    Alk.  phosphatase 80 45 - 117 U/L    Protein, total 7.6 6.4 - 8.2 g/dL    Albumin 4.3 3.5 - 5.0 g/dL    Globulin 3.3 2.0 - 4.0 g/dL    A-G Ratio 1.3 1.1 - 2.2     URINALYSIS W/ REFLEX CULTURE    Collection Time: 09/25/20  4:17 AM    Specimen: Urine   Result Value Ref Range    Color YELLOW/STRAW      Appearance CLEAR CLEAR      Specific gravity 1.018 1.003 - 1.030      pH (UA) 7.0 5.0 - 8.0      Protein Negative NEG mg/dL    Glucose Negative NEG mg/dL    Ketone Negative NEG mg/dL    Bilirubin Negative NEG      Blood MODERATE (A) NEG      Urobilinogen 0.2 0.2 - 1.0 EU/dL    Nitrites Negative NEG      Leukocyte Esterase Negative NEG      WBC 0-4 0 - 4 /hpf    RBC  0 - 5 /hpf    Epithelial cells FEW FEW /lpf    Bacteria Negative NEG /hpf    UA:UC IF INDICATED CULTURE NOT INDICATED BY UA RESULT CNI      Hyaline cast 2-5 0 - 5 /lpf   HCG URINE, QL. - POC    Collection Time: 09/25/20  4:23 AM   Result Value Ref Range    Pregnancy test,urine (POC) Negative NEG         Radiologic Studies -   CT ABD PELV WO CONT   Final Result   IMPRESSION:    A 1 mm calculus at the left ureterovesical junction results in mild left   hydroureteronephrosis. There are multiple 1 mm nonobstructing bilateral renal   calculi. CT Results  (Last 48 hours)               09/25/20 0451  CT ABD PELV WO CONT Final result    Impression:  IMPRESSION:    A 1 mm calculus at the left ureterovesical junction results in mild left   hydroureteronephrosis. There are multiple 1 mm nonobstructing bilateral renal   calculi. Narrative:  EXAM:  CT abdomen pelvis without contrast       INDICATION: Acute left flank pain       COMPARISON: None. TECHNIQUE: Helical CT of the abdomen  and pelvis  without contrast. Coronal and   sagittal reformats are performed. CT dose reduction was achieved through use of   a standardized protocol tailored for this examination and automatic exposure   control for dose modulation. FINDINGS:    Solid organ evaluation is limited without contrast.        The visualized lung bases demonstrate no mass or consolidation. The heart size   is normal. There is no pericardial or pleural effusion. There is a 1 mm calculus at the left ureterovesical junction with mild left   hydroureteronephrosis. There are multiple 1 mm nonobstructing bilateral renal   calculi.  There is no right hydronephrosis. The liver, spleen, pancreas, and adrenal glands are normal.  The gall bladder is   present  without intra- or extra-hepatic biliary dilatation. There are no dilated bowel loops. The appendix is normal.         There are no enlarged lymph nodes. There is no free fluid or free air. The   aorta is normal in caliber. The urinary bladder is unremarkable. There is no pelvic mass. The bony structures are age-appropriate. CXR Results  (Last 48 hours)    None            Medical Decision Making   I am the first provider for this patient. I reviewed the vital signs, available nursing notes, past medical history, past surgical history, family history and social history. Vital Signs-Reviewed the patient's vital signs. Patient Vitals for the past 12 hrs:   Temp Pulse Resp BP SpO2   09/25/20 0610     100 %   09/25/20 0402 97.8 °F (36.6 °C) (!) 113 16 (!) 131/93 100 %       Pulse Oximetry Analysis - 100% on RA    Records Reviewed/Interpretted: Nursing Notes from triage and Old Medical Records noting previous visits for pregnancies    Provider Notes (Medical Decision Making):     DDX:  Kidney stone, pyelonephritis, UTI, colitis, ectopic pregnancy, ovarian cyst rupture    Plan:  Labs, UA, UPT    Impression:  Ureteral stone    ED Course:   Initial assessment performed. The patients presenting problems have been discussed, and they are in agreement with the care plan formulated and outlined with them. I have encouraged them to ask questions as they arise throughout their visit. I reviewed our electronic medical record system for any past medical records that were available that may contribute to the patients current condition, the nursing notes and and vital signs from today's visit  Nursing notes will be reviewed as they become available in realtime while the pt has been in the ED. Bubba Arrington MD      I personally reviewed/interpreted pt's imaging.   Agree with official read by radiology as noted above. Tay Gomez MD      6:12 AM  Progress note:  Pt noted to be feeling better, ready for discharge. Discussed lab and imaging findings with pt, specifically noting no major ureteral stone. Pt will follow up with [facility] as instructed. All questions have been answered, pt voiced understanding and agreement with plan. Specific return precautions provided in addition to instructions for pt to return to the ED immediately should sx worsen at any time. Tay Gomez MD             Critical Care Time:     none      Diagnosis     Clinical Impression:   1. Left ureteral stone    2. Ureteral colic    3. Non-intractable vomiting with nausea, unspecified vomiting type        PLAN:  1. Current Discharge Medication List      START taking these medications    Details   ondansetron (Zofran ODT) 4 mg disintegrating tablet Take 1 Tab by mouth every eight (8) hours as needed for Nausea. Qty: 10 Tab, Refills: 0      promethazine (PHENERGAN) 25 mg suppository Insert 1 Suppository into rectum every six (6) hours as needed for Nausea. Qty: 12 Suppository, Refills: 0      ketorolac (TORADOL) 10 mg tablet Take 1 Tab by mouth every six (6) hours as needed for Pain. Qty: 10 Tab, Refills: 0      tamsulosin (Flomax) 0.4 mg capsule Take 1 Cap by mouth daily for 7 doses. Qty: 7 Cap, Refills: 0           2. Follow-up Information     Follow up With Specialties Details Why Contact Info    Fab Castrejon MD Family Medicine Schedule an appointment as soon as possible for a visit in 2 days  [address] [address] [address]   Kidney stone hotline- [phone], [facility] [phone] [address] [place] [address] [phone]        Return to ED if worse     Disposition:    6:12 AM   The patient's results have been reviewed with family and/or caregiver.  They verbally convey their understanding and agreement of the patient's signs, symptoms, diagnosis, treatment and prognosis and additionally agree to follow up as recommended in the discharge instructions or to return to the Emergency Room should the patient's condition change prior to their follow-up appointment. The family and/or caregiver verbally agrees with the care-plan and all of their questions have been answered. The discharge instructions have also been provided to the them with educational information regarding the patient's diagnosis as well a list of reasons why the patient would want to return to the ER prior to their follow-up appointment should their condition change. Ganesh Vang MD          This note will not be viewable in [address]

## 2020-09-25 NOTE — ED NOTES
Pt came with her father for having left flank pain radiating to the abdomen. Pain started earlier today, pt reports not have taken anything for pain. Pt has been examined by Dr. Vannessa Burt, Nausea and Pain meds administered, pt reports feeling better. Pt's father is at bedside. 2555 : I have reviewed discharge instructions with the patient. The patient verbalized understanding.

## 2020-09-25 NOTE — DISCHARGE INSTRUCTIONS
Patient Education        Kidney Stone: Care Instructions  Your Care Instructions     Kidney stones are formed when salts, minerals, and other substances normally found in the urine clump together. They can be as small as grains of sand or, rarely, as large as golf balls. While the stone is traveling through the ureter, which is the tube that carries urine from the kidney to the bladder, you will probably feel pain. The pain may be mild or very severe. You may also have some blood in your urine. As soon as the stone reaches the bladder, any intense pain should go away. If a stone is too large to pass on its own, you may need a medical procedure to help you pass the stone. The doctor has checked you carefully, but problems can develop later. If you notice any problems or new symptoms, get medical treatment right away. Follow-up care is a key part of your treatment and safety. Be sure to make and go to all appointments, and call your doctor if you are having problems. It's also a good idea to know your test results and keep a list of the medicines you take. How can you care for yourself at home? · Drink plenty of fluids, enough so that your urine is light yellow or clear like water. If you have kidney, heart, or liver disease and have to limit fluids, talk with your doctor before you increase the amount of fluids you drink. · Take pain medicines exactly as directed. Call your doctor if you think you are having a problem with your medicine. ? If the doctor gave you a prescription medicine for pain, take it as prescribed. ? If you are not taking a prescription pain medicine, ask your doctor if you can take an over-the-counter medicine. Read and follow all instructions on the label. · Your doctor may ask you to strain your urine so that you can collect your kidney stone when it passes. You can use a kitchen strainer or a tea strainer to catch the stone.  Store it in a plastic bag until you see your doctor again.  Preventing future kidney stones  Some changes in your diet may help prevent kidney stones. Depending on the cause of your stones, your doctor may recommend that you:  · Drink plenty of fluids, enough so that your urine is light yellow or clear like water. If you have kidney, heart, or liver disease and have to limit fluids, talk with your doctor before you increase the amount of fluids you drink. · Limit coffee, tea, and alcohol. Also avoid grapefruit juice. · Do not take more than the recommended daily dose of vitamins C and D.  · Avoid antacids such as Gaviscon, Maalox, Mylanta, or Tums. · Limit the amount of salt (sodium) in your diet. · Eat a balanced diet that is not too high in protein. · Limit foods that are high in a substance called oxalate, which can cause kidney stones. These foods include dark green vegetables, rhubarb, chocolate, wheat bran, nuts, cranberries, and beans. When should you call for help? Call your doctor now or seek immediate medical care if:    · You cannot keep down fluids.     · Your pain gets worse.     · You have a fever or chills.     · You have new or worse pain in your back just below your rib cage (the flank area).     · You have new or more blood in your urine. Watch closely for changes in your health, and be sure to contact your doctor if:    · You do not get better as expected. Where can you learn more? Go to http://zechariah-levi.info/  Enter R685 in the search box to learn more about \"Kidney Stone: Care Instructions. \"  Current as of: April 15, 2020               Content Version: 12.6  © 3878-7800 [employer]. Care instructions adapted under license by [employer] (which disclaims liability or warranty for this information).  If you have questions about a medical condition or this instruction, always ask your healthcare professional. [address] any warranty or liability for your use of this information. Patient Education        Nausea and Vomiting: Care Instructions  Your Care Instructions     When you are nauseated, you may feel weak and sweaty and notice a lot of saliva in your mouth. Nausea often leads to vomiting. Most of the time you do not need to worry about nausea and vomiting, but they can be signs of other illnesses. Two common causes of nausea and vomiting are stomach flu and food poisoning. Nausea and vomiting from viral stomach flu will usually start to improve within 24 hours. Nausea and vomiting from food poisoning may last from 12 to 48 hours. The doctor has checked you carefully, but problems can develop later. If you notice any problems or new symptoms, get medical treatment right away. Follow-up care is a key part of your treatment and safety. Be sure to make and go to all appointments, and call your doctor if you are having problems. It's also a good idea to know your test results and keep a list of the medicines you take. How can you care for yourself at home? · To prevent dehydration, drink plenty of fluids, enough so that your urine is light yellow or clear like water. Choose water and other caffeine-free clear liquids until you feel better. If you have kidney, heart, or liver disease and have to limit fluids, talk with your doctor before you increase the amount of fluids you drink. · Rest in bed until you feel better. · When you are able to eat, try clear soups, mild foods, and liquids until all symptoms are gone for 12 to 48 hours. Other good choices include dry toast, crackers, cooked cereal, and gelatin dessert, such as Jell-O. When should you call for help? Call 911 anytime you think you may need emergency care. For example, call if:    · You passed out (lost consciousness). Call your doctor now or seek immediate medical care if:    · You have symptoms of dehydration, such as:  ? Dry eyes and a dry mouth. ? Passing only a little dark urine. ?  Feeling thirstier than usual.     · You have new or worsening belly pain.     · You have a new or higher fever.     · You vomit blood or what looks like coffee grounds. Watch closely for changes in your health, and be sure to contact your doctor if:    · You have ongoing nausea and vomiting.     · Your vomiting is getting worse.     · Your vomiting lasts longer than 2 days.     · You are not getting better as expected. Where can you learn more? Go to http://zechariah-levi.info/  Enter H591 in the search box to learn more about \"Nausea and Vomiting: Care Instructions. \"  Current as of: June 26, 2019               Content Version: 12.6  © 4934-1402 [employer]. Care instructions adapted under license by [employer] (which disclaims liability or warranty for this information). If you have questions about a medical condition or this instruction, always ask your healthcare professional. [place] [address] any warranty or liability for your use of this information.

## 2020-10-06 DIAGNOSIS — E05.00 GRAVES DISEASE: Primary | ICD-10-CM

## 2020-10-16 LAB
T4 FREE SERPL-MCNC: 0.59 NG/DL (ref 0.82–1.77)
TSH RECEP AB SER-ACNC: 13.5 IU/L (ref 0–1.75)
TSH SERPL DL<=0.005 MIU/L-ACNC: 48.3 UIU/ML (ref 0.45–4.5)

## 2020-10-16 RX ORDER — LEVOTHYROXINE SODIUM 88 UG/1
88 TABLET ORAL
Qty: 90 TAB | Refills: 3 | Status: SHIPPED | OUTPATIENT
Start: 2020-10-16 | End: 2020-11-04

## 2020-11-04 RX ORDER — LEVOTHYROXINE SODIUM 88 UG/1
TABLET ORAL
Qty: 90 TAB | Refills: 3
Start: 2020-11-04 | End: 2020-11-09

## 2020-11-09 RX ORDER — LEVOTHYROXINE SODIUM 75 UG/1
TABLET ORAL
Qty: 90 TAB | Refills: 3 | Status: SHIPPED | OUTPATIENT
Start: 2020-11-09 | End: 2020-11-22

## 2020-11-17 DIAGNOSIS — E05.00 GRAVES DISEASE: ICD-10-CM

## 2020-11-26 LAB
T4 FREE SERPL-MCNC: 1.33 NG/DL (ref 0.82–1.77)
TSH SERPL DL<=0.005 MIU/L-ACNC: 0.1 UIU/ML (ref 0.45–4.5)

## 2020-12-01 ENCOUNTER — VIRTUAL VISIT (OUTPATIENT)
Dept: ENDOCRINOLOGY | Age: 30
End: 2020-12-01
Payer: COMMERCIAL

## 2020-12-01 DIAGNOSIS — E05.00 GRAVES DISEASE: Primary | ICD-10-CM

## 2020-12-01 PROCEDURE — 99213 OFFICE O/P EST LOW 20 MIN: CPT | Performed by: INTERNAL MEDICINE

## 2020-12-01 RX ORDER — PROPRANOLOL HYDROCHLORIDE 10 MG/1
10 TABLET ORAL 2 TIMES DAILY
Qty: 60 TAB | Refills: 11 | Status: SHIPPED | OUTPATIENT
Start: 2020-12-01 | End: 2021-06-01

## 2020-12-01 NOTE — PATIENT INSTRUCTIONS
1) I sent propranolol 10 mg tabs to take 1 tab with breakfast and dinner for the next month to [facility]. 2) Your TSH (thyroid test) is currently low which means you are mildly HYPERthyroid and we'll keep you off levothyroxine. 3) I put an order directly into the [facility] system to repeat your labs in 4 weeks and in the 1-2 weeks prior to your next visit so just ask for the order under my name and you will receive a courtesy reminder through [employer] to have these drawn. 4) Please come for a follow up visit on 6/1/21 at 11:30am in our [place] office.

## 2020-12-01 NOTE — PROGRESS NOTES
Chief Complaint   Patient presents with    Thyroid Problem     pcp and pharmacy confirmed    Other     [email or web address]       **THIS IS A VIRTUAL VISIT VIA A VIDEO SYNCHRONOUS DISCUSSION. PATIENT AGREED TO HAVE THEIR CARE DELIVERED OVER A DOXY. ME VIDEO VISIT IN PLACE OF THEIR REGULARLY SCHEDULED OFFICE VISIT**    History of Present Illness: Maycol Perez is a 35 y.o. female here for follow up of thyroid. Delivered her daughter, Hortensia Cranker, in 4/20 vaginally. Has stopped the levothyroxine but still having palpitations off this. Having a lot of hair loss that has been worse the past few weeks. Was having more tremors on levothyroxine that are improving off this. Bowels are regular. No longer feeling cold and is now more warm. She is not breast feeding. No current outpatient medications on file. No current facility-administered medications for this visit. Allergies   Allergen Reactions    Pcn [Penicillins] Unknown (comments)    Sulfa (Sulfonamide Antibiotics) Rash     A.s child     Review of Systems: PER HPI    Physical Examination:  - GENERAL: NCAT, Appears well nourished   - EYES: EOMI, non-icteric, no proptosis   - Ear/Nose/Throat: NCAT, no visible inflammation or masses   - CARDIOVASCULAR: no cyanosis, no visible JVD   - RESPIRATORY: respiratory effort normal without any distress or labored breathing   - MUSCULOSKELETAL: Normal ROM of neck and upper extremities observed   - SKIN: No rash on face  - NEUROLOGIC:  No facial asymmetry (Cranial nerve 7 motor function), No gaze palsy   - PSYCHIATRIC: Normal affect, Normal insight and judgement       Data Reviewed:   Component      Latest Ref Rng & Units 11/25/2020 11/25/2020          10:59 AM 10:59 AM   T4, Free      0.82 - 1.77 ng/dL  1.33   TSH      0.450 - 4.500 uIU/mL 0.096 (L)        Assessment/Plan:     1.  Grave's disease: She has always been told she has normal thyroid function and delivered her son, Zohra Barraza, on 12/21/15 and after delivery developed hypertension and needed metoprolol. Had her thyroid levels checked after delivery and TSH was normal but saw Dr. Judy Sandifer on 5/18/16 and TSH was low at 0.012 and T4 was high at 13.3. Repeat labs with me in 5/16 showed TSH 0.009, FT4 3.27 and T3 263 and TRAb of 1.97 and TPO ab of 43 and I started her on MMI 30 mg daily. TSH up to 0.013 and FT4/T3 normal in 6/16 so decreased to 20 mg daily and TSH up to 8.93 in 7/16 so decreased to 10 mg daily and TSH up to 33 in 8/16 so decreased to 5 mg daily. TSH normal at 0.61 and FT4 1.34 and T3 151 in 10/16 so kept her dose the same. Then found out she was pregnant and TSH 3.47 in 11/16 and changed to PTU 50 mg 1 tab in am and 1/2 tab in pm during 1st trimester and then changed back to MMI 10 mg 1/2 tab daily when she was in 2nd trimester. TSH 4.73 and TRAb 2.75 and TPO ab 29 in 1/17 and decreased to 1/2 tab 4x/week,  TSH 2.32 in 3/17 and decreased to 1/2 tab 3x/week and TSH 1.76 and TRAb 0.83 in 4/17 and kept her dose the same through delivery in 6/17. TSH 3.76 and TRAb < 0.50 in 9/17 so decreased to 1/2 tab 2x/week for 3 months and TSH 3.35 in 12/17 so decreased to 1/2 tab once a week and TSH down to 0.439 and TRAb up to 1.99 in 3/18 so went back to 1/2 tab 3x/week. TSH 3.88 in 6/18 so decreased back to 1/2 of 10 mg tab 2x/week and TSH up to 5.8 in 9/18 so decreased to 1/2 tab once a week and TSH 2.93 and TRAb 0.83 in 1/19 so kept dose the same. TSH 3.86 and TRAb <0.50 in 3/19 so stopped the methimazole as her Grave's antibody test was undetectable and she was pregnant. She miscarried in 4/19 and then became pregnant again in 8/19 and her thyroid tests were normal during pregnancy and remained off medication. She delivered in 4/20 and her TSH was normal in 6/20 at 2.8.   She contacted me in 10/20 that she felt off and her TSH was 48 and TRAab was high at 13.5 so I started levothyroxine 88 mcg daily but she started developing hyperthyroid symptoms on this so I decreased to 75 mcg daily but still had hyperthyroid symptoms and stopped this on 11/22/20. Her TSH was low at 0.096 and FT4 normal at 1.33 on 11/25/20 so it's possible she has developed post-partum thyroiditis and will hold on taking any meds for the next 4 weeks and just use propranolol for the palpitations. - stay off methimazole and levothyroxine   - begin propranolol 10 mg bid  - check TSH and free T4 and TSH receptor antibody in 4 weeks and prior to next visit          Patient Instructions   1) I sent propranolol 10 mg tabs to take 1 tab with breakfast and dinner for the next month to [facility] [employer]. 2) Your TSH (thyroid test) is currently low which means you are mildly HYPERthyroid and we'll keep you off levothyroxine. 3) I put an order directly into the [facility] system to repeat your labs in 4 weeks and in the 1-2 weeks prior to your next visit so just ask for the order under my name and you will receive a courtesy reminder through [employer] to have these drawn. 4) Please come for a follow up visit on 6/1/21 at 11:30am in our [place] office. Follow-up and Dispositions    · Return 6/1/21 at 11:30am.               Copy sent to:  Dr. Lorne Robertson via [facility]  Dr. Keturah Blount follow up: 1/1/21  Component      Latest Ref Rng & Units 12/31/2020 12/31/2020 12/31/2020          10:40 AM 10:40 AM 10:40 AM   T4, Free      0.82 - 1.77 ng/dL   0.94   TSH      0.450 - 4.500 uIU/mL  2.130    Thyrotropin Receptor Ab, serum      0.00 - 1.75 IU/L 14.00 (H)       Sent her the following message through [employer]:  Your TSH (thyroid test) is back to normal off any medication so at this time your thyroid function is normal and you do not need to take any medication. Your TSH receptor antibody, which is a test for Grave's disease, is still quite high at 14 and stable from 13.5 in 10/20.   As long as you are feeling better off any medication we will just repeat your labs again 1-2 weeks prior to your next visit in June but if you feel you need to have your labs checked sooner, just let me know. Lab follow up: 2/16/21  Component      Latest Ref Rng & Units 2/15/2021 2/15/2021 2/15/2021          10:35 AM 10:35 AM 10:35 AM   T4, Free      0.82 - 1.77 ng/dL   1.24   TSH      0.450 - 4.500 uIU/mL  0.065 (L)    Thyrotropin Receptor Ab, serum      0.00 - 1.75 IU/L 11.10 (H)       Sent her the following message through [employer]:  Your TSH (thyroid test) has now gone low again confirming that you have become hyperthyroid again to explain why you are feeling the way you did. Clearly this fall you had thyroiditis when you flipped from hypothyroidism (with a high TSH of 48) to now hyperthyroidism (with a low TSH). Your free T4 is normal at 1.24 so your hyperthyroidism is not very severe at this time. Your TSH receptor antibody, which is a test for Grave's disease, is high at 11.1 but down slightly from 14 at your last check which means your Grave's disease is less active than at last check. I recommend we restart methimazole 10 mg 1 tab daily for the next month to try and bring your TSH back to normal.  This will be ready for  at the pharmacy today.  -------------------------------------------------------------------------------------------------------------------  I put an order directly into the [employer] system to repeat your labs in 1 month so just ask for the order under my name and you will receive a courtesy reminder through [employer] to have these drawn and I'll be in touch with the results. Lab follow up: 4/2/21  Component      Latest Ref Rng & Units 4/1/2021 4/1/2021           3:33 PM  3:33 PM   T4, Free      0.82 - 1.77 ng/dL  0.96   TSH      0.450 - 4.500 uIU/mL 1.780      Sent her the following message through [employer]:  TSH is a thyroid test.  Your level is 1.78 which is normal and at goal of 0.45-2.0.   This test goes opposite of your thyroid dose and suggests your dose of methimazole is working well. Because your free T4 is trending down, I recommend we have you take 1 tab 6 days a week (skip Sunday) for the month of April and then in May decrease to 1 tab 5x/week (skip Sun and Wed) until you come back in June. I updated your med list but did not send a new prescription to your pharmacy on file that has been filling this med. Lab follow up: 5/13/21  Component      Latest Ref Rng & Units 5/11/2021 5/11/2021 5/11/2021           4:03 PM  4:03 PM  4:03 PM   Thyrotropin Receptor Ab, serum      0.00 - 1.75 IU/L   3.05 (H)   TSH      0.450 - 4.500 uIU/mL  6.390 (H)    T4, Free      0.82 - 1.77 ng/dL 0.86       Sent her the following message through [email or web address]:  TSH is a thyroid test.  Your level is 6.39 which is high and above goal of 0.45-2.0. This test goes opposite of your thyroid dose and suggests your dose of methimazole is working well but is now too much. I will decrease your dose to 5 mg tabs to take 1 tab 7 days a week and sent a supply to your pharmacy to take until you come back for your virtual visit on 6/1/21. Feel free to use up the 10 mg tabs taking 1/2 tab daily. Given this visit is in less than 3 weeks, I will not have you do further labs before the visit but let's check in and see how your symptoms are doing and then we'll decide when to repeat your labs again after the visit.  -------------------------------------------------------------------------------------------------------------------  Your TSH receptor antibody, which is a test for Grave's disease, is high at 3.05 but down from 11.1 at your last check which means your Grave's disease is less active than at last check.  As long as this is elevated over 1.1, we will not be able to stop the medication and will try to get to the lowest dose possible that keeps your levels normal and hopefully in the future will be able to taper you off medication.  -------------------------------------------------------------------------------------------------------------------

## 2020-12-23 DIAGNOSIS — J02.9 PHARYNGITIS, UNSPECIFIED ETIOLOGY: Primary | ICD-10-CM

## 2020-12-23 RX ORDER — AZITHROMYCIN 250 MG/1
TABLET, FILM COATED ORAL
Qty: 6 TAB | Refills: 0 | Status: SHIPPED | OUTPATIENT
Start: 2020-12-23 | End: 2021-06-01

## 2020-12-29 DIAGNOSIS — E05.00 GRAVES DISEASE: ICD-10-CM

## 2021-01-01 LAB
T4 FREE SERPL-MCNC: 0.94 NG/DL (ref 0.82–1.77)
TSH RECEP AB SER-ACNC: 14 IU/L (ref 0–1.75)
TSH SERPL DL<=0.005 MIU/L-ACNC: 2.13 UIU/ML (ref 0.45–4.5)

## 2021-02-10 DIAGNOSIS — E05.00 GRAVES DISEASE: ICD-10-CM

## 2021-02-10 DIAGNOSIS — E05.00 GRAVES DISEASE: Primary | ICD-10-CM

## 2021-02-16 LAB
T4 FREE SERPL-MCNC: 1.24 NG/DL (ref 0.82–1.77)
TSH RECEP AB SER-ACNC: 11.1 IU/L (ref 0–1.75)
TSH SERPL DL<=0.005 MIU/L-ACNC: 0.07 UIU/ML (ref 0.45–4.5)

## 2021-02-16 RX ORDER — METHIMAZOLE 10 MG/1
10 TABLET ORAL DAILY
Qty: 30 TAB | Refills: 11 | Status: SHIPPED | OUTPATIENT
Start: 2021-02-16 | End: 2021-04-02

## 2021-03-16 DIAGNOSIS — E05.00 GRAVES DISEASE: ICD-10-CM

## 2021-04-02 LAB
T4 FREE SERPL-MCNC: 0.96 NG/DL (ref 0.82–1.77)
TSH SERPL DL<=0.005 MIU/L-ACNC: 1.78 UIU/ML (ref 0.45–4.5)

## 2021-04-02 RX ORDER — METHIMAZOLE 10 MG/1
TABLET ORAL
Qty: 30 TAB | Refills: 11
Start: 2021-04-02 | End: 2021-05-13

## 2021-05-09 DIAGNOSIS — E05.00 GRAVES DISEASE: ICD-10-CM

## 2021-05-12 LAB
T4 FREE SERPL-MCNC: 0.86 NG/DL (ref 0.82–1.77)
TSH RECEP AB SER-ACNC: 3.05 IU/L (ref 0–1.75)
TSH SERPL DL<=0.005 MIU/L-ACNC: 6.39 UIU/ML (ref 0.45–4.5)

## 2021-05-13 RX ORDER — METHIMAZOLE 5 MG/1
TABLET ORAL
Qty: 30 TAB | Refills: 11 | Status: SHIPPED | OUTPATIENT
Start: 2021-05-13 | End: 2021-06-25

## 2021-06-01 ENCOUNTER — VIRTUAL VISIT (OUTPATIENT)
Dept: ENDOCRINOLOGY | Age: 31
End: 2021-06-01
Payer: COMMERCIAL

## 2021-06-01 DIAGNOSIS — E05.00 GRAVES DISEASE: Primary | ICD-10-CM

## 2021-06-01 PROCEDURE — 99214 OFFICE O/P EST MOD 30 MIN: CPT | Performed by: INTERNAL MEDICINE

## 2021-06-01 NOTE — PROGRESS NOTES
Chief Complaint   Patient presents with    Follow-up     PCP and Pharmacy verified    Thyroid Problem     Doxy: [phone] (android)       **THIS IS A VIRTUAL VISIT VIA A VIDEO SYNCHRONOUS DISCUSSION. PATIENT AGREED TO HAVE THEIR CARE DELIVERED OVER A DOXIMNEXT. ME VIDEO VISIT IN PLACE OF THEIR REGULARLY SCHEDULED OFFICE VISIT**    History of Present Illness: Chu Roche is a 35 y.o. female here for follow up of thyroid. Has been taking the MMI 5 mg daily over the past 3 weeks. Has not been taking any propranolol aside from about 1 week after I wrote for this. Still has some chest heaviness and shortness of breath despite whatever dose she has been on. Bowels are regular. Does feel cold but less so than a few weeks. Does still fatigue that is unchanged. Current Outpatient Medications   Medication Sig    methIMAzole (TAPAZOLE) 5 mg tablet Take 1 tab daily--delete the 10 mg tabs from profile     No current facility-administered medications for this visit. Allergies   Allergen Reactions    Pcn [Penicillins] Unknown (comments)    Sulfa (Sulfonamide Antibiotics) Rash     A.s child     Review of Systems: PER HPI    Physical Examination:  - GENERAL: NCAT, Appears well nourished   - EYES: EOMI, non-icteric, no proptosis   - Ear/Nose/Throat: NCAT, no visible inflammation or masses   - CARDIOVASCULAR: no cyanosis, no visible JVD   - RESPIRATORY: respiratory effort normal without any distress or labored breathing   - MUSCULOSKELETAL: Normal ROM of neck and upper extremities observed   - SKIN: No rash on face  - NEUROLOGIC:  No facial asymmetry (Cranial nerve 7 motor function), No gaze palsy   - PSYCHIATRIC: Normal affect, Normal insight and judgement       Data Reviewed:   - none new for review    Assessment/Plan:     1. Grave's disease: She has always been told she has normal thyroid function and delivered her son, Ewelina Lu, on 12/21/15 and after delivery developed hypertension and needed metoprolol.   Had her thyroid levels checked after delivery and TSH was normal but saw Dr. Vanesa Ch on 5/18/16 and TSH was low at 0.012 and T4 was high at 13.3. Repeat labs with me in 5/16 showed TSH 0.009, FT4 3.27 and T3 263 and TRAb of 1.97 and TPO ab of 43 and I started her on MMI 30 mg daily. TSH up to 0.013 and FT4/T3 normal in 6/16 so decreased to 20 mg daily and TSH up to 8.93 in 7/16 so decreased to 10 mg daily and TSH up to 33 in 8/16 so decreased to 5 mg daily. TSH normal at 0.61 and FT4 1.34 and T3 151 in 10/16 so kept her dose the same. Then found out she was pregnant and TSH 3.47 in 11/16 and changed to PTU 50 mg 1 tab in am and 1/2 tab in pm during 1st trimester and then changed back to MMI 10 mg 1/2 tab daily when she was in 2nd trimester. TSH 4.73 and TRAb 2.75 and TPO ab 29 in 1/17 and decreased to 1/2 tab 4x/week,  TSH 2.32 in 3/17 and decreased to 1/2 tab 3x/week and TSH 1.76 and TRAb 0.83 in 4/17 and kept her dose the same through delivery in 6/17. TSH 3.76 and TRAb < 0.50 in 9/17 so decreased to 1/2 tab 2x/week for 3 months and TSH 3.35 in 12/17 so decreased to 1/2 tab once a week and TSH down to 0.439 and TRAb up to 1.99 in 3/18 so went back to 1/2 tab 3x/week. TSH 3.88 in 6/18 so decreased back to 1/2 of 10 mg tab 2x/week and TSH up to 5.8 in 9/18 so decreased to 1/2 tab once a week and TSH 2.93 and TRAb 0.83 in 1/19 so kept dose the same. TSH 3.86 and TRAb <0.50 in 3/19 so stopped the methimazole as her Grave's antibody test was undetectable and she was pregnant. She miscarried in 4/19 and then became pregnant again in 8/19 and her thyroid tests were normal during pregnancy and remained off medication. She delivered in 4/20 and her TSH was normal in 6/20 at 2.8.   She contacted me in 10/20 that she felt off and her TSH was 48 and TRAab was high at 13.5 so I started levothyroxine 88 mcg daily but she started developing hyperthyroid symptoms on this so I decreased to 75 mcg daily but still had hyperthyroid symptoms and stopped this on 11/22/20. Her TSH was low at 0.096 and FT4 normal at 1.33 on 11/25/20 so it's possible she has developed post-partum thyroiditis and TSH normal at 2.13 and TRAb 14 on 12/31/20 so held on any medication. TSH down to 0.065 and TRAb 11.1 in 2/21 and began methimazole 10 mg daily. TSH 1.28 on 4/1/21 so decreased to 6 tabs/week. TSH up to 6.39 and TRAb 3.05 on 5/11/21 so decreased to 5 mg daily. - cont methimazole 5 mg daily for 2 more weeks  - check TSH and free T4 and TSH receptor antibody in 2 weeks and prior to next visit            Follow-up and Dispositions    · Return 12/10/21 at 11:30am.               Copy sent to:  Dr. Ye Sylvester via [facility]  Dr. José Miguel Crockett    Lab follow up: 6/25/21  Component      Latest Ref Rng & Units 6/22/2021 6/22/2021 6/22/2021          12:07 PM 12:07 PM 12:07 PM   T4, Free      0.82 - 1.77 ng/dL   1.12   TSH      0.450 - 4.500 uIU/mL  6.070 (H)    Thyrotropin Receptor Ab, serum      0.00 - 1.75 IU/L 1.66       Sent her the following message through [employer] some [employer]:  TSH is a thyroid test.  Your level is 6.07 which is high and above goal of 0.45-2.0. This test goes opposite of your thyroid dose and suggests your dose of methimazole is more than you need. I will decrease your dose to 1/2 tab everyday. I updated your med list but did not send a new prescription to your pharmacy on file that has been filling this med.   It's possible that having a high TSH (meaning you are hypothyroid at this time) can affect your eyes and cause some of your eye symptoms and hopefully with cutting back on your methimazole, your symptoms will improve but I still think it's worth seeing Dr. Rekha Cunningham for an opinion if these symptoms continue.  -------------------------------------------------------------------------------------------------------------------  Your TSH receptor antibody, which is a test for Grave's disease, is normal at 1.66 and down from 3.05 at your last check which means your Grave's disease is less active than at last check. As long as this is elevated over 1.1, we will not be able to stop the medication and will try to get to the lowest dose possible that keeps your levels normal and hopefully in the future will be able to taper you off medication.  -------------------------------------------------------------------------------------------------------------------  I put an order directly into the [facility] system to repeat your labs in 1 month so just ask for the order under my name and you will receive a courtesy reminder through [employer] to have these drawn and I'll be in touch with the results. Lab follow up: 8/10/21  Component      Latest Ref Rng & Units 8/4/2021 8/4/2021          12:51 PM 12:51 PM   T4, Free      0.82 - 1.77 ng/dL  1.17   TSH      0.450 - 4.500 uIU/mL 2.680      Labs drawn by Dr. Chance Mccann:  TPO ab 80  TG ab 7.7    Sent her the following message through [employer]:  TSH is a thyroid test.  Your level is 2.68 which is normal but still above goal of 0.45-2.0. This test goes opposite of your thyroid dose and suggests your dose of methimazole is working well but still more than you need. I will decrease your dose to 1/2 tab 5x/week and have you skip Sun and Wed. I updated your med list but did not send a new prescription to your pharmacy on file that has been filling this med. I was able to retrieve the thyroid antibody levels that Dr. Chance Mccann emery from the [facility] site. Your thyroid peroxidase (TPO) antibody and thyroglobulin antibody levels were elevated but this is very common with Grave's disease. I reviewed all the labs in my system and I had drawn your TPO antibody previously and it was 43 in 5/16 and 29 in 1/17 (normal is under 34) and yours is currently 87 which means your immune system is more active than it was 4-5 years ago.   I had never drawn your thyroglobulin level in the past so we can start following these levels along with your TSH receptor antibody so we will have a complete panel of how your immune system is doing.    -------------------------------------------------------------------------------------------------------------------  I put an order directly into the [employer] system to repeat your labs in 1 month so just ask for the order under my name and you will receive a courtesy reminder through [facility] to have these drawn and I'll be in touch with the results. Lab follow up: 9/25/21  Component      Latest Ref Rng & Units 9/23/2021 9/23/2021 9/23/2021 9/23/2021           9:58 AM  9:58 AM  9:58 AM  9:58 AM   Thyroid peroxidase Ab      0 - 34 IU/mL  88 (H)     Thyroglobulin Ab      0.0 - 0.9 IU/mL  7.4 (H)     T4, Free      0.82 - 1.77 ng/dL    1.11   TSH      0.450 - 4.500 uIU/mL   3.230    Thyrotropin Receptor Ab, serum      0.00 - 1.75 IU/L <1.10        Sent her the following message through [employer]:  TSH is a thyroid test.  Your level is 3.23 which is normal but above goal of 0.45-2.0. This test goes opposite of your thyroid dose and suggests your dose of methimazole is still working well but more than you need. I will decrease your dose to 1/2 tab 4x/week and have you skip Sun, Wed and Fri. I updated your med list but did not send a new prescription to your pharmacy on file that has been filling this med. Your TPO antibody is stable at 88 from 87 at last check and thyroglobulin antibody is also stable at 7.4 from 7.7 at your last check. Your TSH receptor antibody is now undetectable so I'm hoping your immune system is starting to calm down.    -------------------------------------------------------------------------------------------------------------------  I put an order directly into the [employer] system to repeat your labs in 1 month so just ask for the order under my name and you will receive a courtesy reminder through [facility] to have these drawn and I'll be in touch with the results.       Lab follow up: 10/28/21  Component      Latest Ref Rng & Units 10/26/2021 10/26/2021          10:56 AM 10:56 AM   T4, Free      0.82 - 1.77 ng/dL  1.27   TSH      0.450 - 4.500 uIU/mL 4.660 (H)      Sent her the following message through "[employer]:  TSH is a thyroid test.  Your level is 4.66 which is high and above goal of 0.45-2.0. This test goes opposite of your thyroid dose and suggests your dose of methimazole is still working well and more than you need. I will decrease your dose to 1/2 tab 3x/week on Tues, Thurs, and Sat until you come back to see me. I updated your med list but did not send a new prescription to your pharmacy on file that has been filling this med.  -------------------------------------------------------------------------------------------------------------------  I put an order directly into the [employer] system to repeat your labs in the 1-2 weeks prior to your next visit so just ask for the order under my name and you will receive a courtesy reminder through [email or web address] to have these drawn.

## 2021-06-23 LAB
T4 FREE SERPL-MCNC: 1.12 NG/DL (ref 0.82–1.77)
TSH RECEP AB SER-ACNC: 1.66 IU/L (ref 0–1.75)
TSH SERPL DL<=0.005 MIU/L-ACNC: 6.07 UIU/ML (ref 0.45–4.5)

## 2021-06-25 RX ORDER — METHIMAZOLE 5 MG/1
TABLET ORAL
Qty: 30 TABLET | Refills: 11
Start: 2021-06-25 | End: 2021-08-10

## 2021-07-19 ENCOUNTER — TELEPHONE (OUTPATIENT)
Dept: ENDOCRINOLOGY | Age: 31
End: 2021-07-19

## 2021-07-19 NOTE — TELEPHONE ENCOUNTER
----- Message from Jocelyn Frey sent at 7/19/2021 10:52 AM EDT -----  Regarding: Dr. Razia Mckenzie: [phone]  General Message/Vendor Calls    Caller's first and last name:Dall[facility]      Reason for call:Please fax over pt's last office visit note to Dr. Aisha Mcgarry at fax [phone] required yes/no and why:Yes, confirm.        Best contact number(s):[phone]      Details to clarify the request:n/a      Jocelyn Frey

## 2021-07-20 NOTE — TELEPHONE ENCOUNTER
Last office visit has been faxed to the number provided.   [facility] ([facility])

## 2021-07-21 ENCOUNTER — TRANSCRIBE ORDER (OUTPATIENT)
Dept: SCHEDULING | Age: 31
End: 2021-07-21

## 2021-07-21 DIAGNOSIS — E07.9 DISORDER OF THYROID: Primary | ICD-10-CM

## 2021-07-21 DIAGNOSIS — E05.00 THYROID-RELATED PROPTOSIS: ICD-10-CM

## 2021-07-25 DIAGNOSIS — E05.00 GRAVES DISEASE: ICD-10-CM

## 2021-07-26 ENCOUNTER — HOSPITAL ENCOUNTER (OUTPATIENT)
Dept: CT IMAGING | Age: 31
Discharge: HOME | End: 2021-07-26
Attending: OPHTHALMOLOGY
Payer: COMMERCIAL

## 2021-07-26 DIAGNOSIS — E07.9 DISORDER OF THYROID: ICD-10-CM

## 2021-07-26 DIAGNOSIS — E05.00 THYROID-RELATED PROPTOSIS: ICD-10-CM

## 2021-07-26 PROCEDURE — 70480 CT ORBIT/EAR/FOSSA W/O DYE: CPT

## 2021-07-28 DIAGNOSIS — J32.0 CHRONIC MAXILLARY SINUSITIS: Primary | ICD-10-CM

## 2021-07-28 RX ORDER — GUAIFENESIN 600 MG/1
600 TABLET, EXTENDED RELEASE ORAL 2 TIMES DAILY
Qty: 20 TABLET | Refills: 4 | Status: SHIPPED | OUTPATIENT
Start: 2021-07-28 | End: 2021-12-10

## 2021-07-28 RX ORDER — DOXYCYCLINE 100 MG/1
100 CAPSULE ORAL 2 TIMES DAILY
Qty: 14 CAPSULE | Refills: 0 | Status: SHIPPED | OUTPATIENT
Start: 2021-07-28 | End: 2021-08-04

## 2021-08-05 LAB
T4 FREE SERPL-MCNC: 1.17 NG/DL (ref 0.82–1.77)
TSH SERPL DL<=0.005 MIU/L-ACNC: 2.68 UIU/ML (ref 0.45–4.5)

## 2021-08-10 ENCOUNTER — TELEPHONE (OUTPATIENT)
Dept: ENDOCRINOLOGY | Age: 31
End: 2021-08-10

## 2021-08-10 RX ORDER — METHIMAZOLE 5 MG/1
TABLET ORAL
Qty: 30 TABLET | Refills: 11
Start: 2021-08-10 | End: 2021-09-25

## 2021-08-10 NOTE — TELEPHONE ENCOUNTER
Please let her know I was on vacation and just got back yesterday and was swamped and will write back to her tonight after 5pm

## 2021-08-10 NOTE — TELEPHONE ENCOUNTER
----- Message from Montse Hilton sent at 8/10/2021  9:16 AM EDT -----  Regarding: Shanti Ding MD/ telephone  Caller's first and last name:Reason for call: trying to reach [address], [address] [address] required yes/no and why: yes/discussBest contact number(s): [phone] to clarify the request: pt stated that she has been reaching out to Dr. Chun Reyes on [employer] and he has NOT responded. Pt would like a call back to a respond on [employer].   Thank you

## 2021-09-02 ENCOUNTER — OFFICE VISIT (OUTPATIENT)
Dept: FAMILY MEDICINE CLINIC | Age: 31
End: 2021-09-02
Payer: COMMERCIAL

## 2021-09-02 VITALS
HEART RATE: 66 BPM | TEMPERATURE: 98.4 F | SYSTOLIC BLOOD PRESSURE: 116 MMHG | OXYGEN SATURATION: 99 % | BODY MASS INDEX: 22.03 KG/M2 | WEIGHT: 112.2 LBS | RESPIRATION RATE: 16 BRPM | DIASTOLIC BLOOD PRESSURE: 80 MMHG | HEIGHT: 60 IN

## 2021-09-02 DIAGNOSIS — R35.1 NOCTURIA: ICD-10-CM

## 2021-09-02 DIAGNOSIS — R51.9 HEADACHE DISORDER: ICD-10-CM

## 2021-09-02 DIAGNOSIS — E05.00 GRAVES DISEASE: Primary | ICD-10-CM

## 2021-09-02 DIAGNOSIS — Z13.21 ENCOUNTER FOR VITAMIN DEFICIENCY SCREENING: ICD-10-CM

## 2021-09-02 DIAGNOSIS — Z83.3 FAMILY HISTORY OF DIABETES MELLITUS: ICD-10-CM

## 2021-09-02 LAB
BILIRUB UR QL STRIP: NEGATIVE
GLUCOSE UR-MCNC: NEGATIVE MG/DL
KETONES P FAST UR STRIP-MCNC: NEGATIVE MG/DL
PH UR STRIP: 6 [PH] (ref 4.6–8)
PROT UR QL STRIP: NEGATIVE
SP GR UR STRIP: 1.03 (ref 1–1.03)
UA UROBILINOGEN AMB POC: NORMAL (ref 0.2–1)
URINALYSIS CLARITY POC: CLEAR
URINALYSIS COLOR POC: YELLOW
URINE BLOOD POC: NORMAL
URINE LEUKOCYTES POC: NEGATIVE
URINE NITRITES POC: NEGATIVE

## 2021-09-02 PROCEDURE — 81003 URINALYSIS AUTO W/O SCOPE: CPT | Performed by: FAMILY MEDICINE

## 2021-09-02 PROCEDURE — 99213 OFFICE O/P EST LOW 20 MIN: CPT | Performed by: FAMILY MEDICINE

## 2021-09-02 NOTE — PROGRESS NOTES
Chief Complaint   Patient presents with    Other     Pt states L eye larger than R eye.     1. Have you been to the ER, urgent care clinic since your last visit? Hospitalized since your last visit? No    2. Have you seen or consulted any other health care providers outside of the [address] [place] [address] since your last visit? Include any pap smears or colon screening.  No

## 2021-09-02 NOTE — PROGRESS NOTES
HISTORY OF PRESENT ILLNESS  Ifeoma Casper is a 35 y.o. female. f/u labile HBP,frequent frontal headache,nocturia. Followed by Luis Daniel for Graves Disease,on methimazole  Headache  The history is provided by the patient. This is a recurrent problem. The problem occurs every several days. The problem has been gradually worsening. Associated symptoms include headaches. Pertinent negatives include no chest pain. Nothing aggravates the symptoms. Nothing relieves the symptoms. Nocturia  The history is provided by the patient. This is a chronic problem. The problem occurs daily. The problem has not changed since onset. Associated symptoms include headaches. Pertinent negatives include no chest pain. Review of Systems   Constitutional: Negative for fever and malaise/fatigue. HENT: Negative for hearing loss. Eyes: Negative for blurred vision and photophobia. Cardiovascular: Negative for chest pain, palpitations and orthopnea. Genitourinary: Positive for frequency and nocturia. Negative for dysuria and urgency. Musculoskeletal: Negative for myalgias. Skin: Negative for rash. Neurological: Positive for headaches. Psychiatric/Behavioral: The patient is not nervous/anxious. Physical Exam  Constitutional:       Appearance: Normal appearance. She is normal weight. HENT:      Head: Normocephalic and atraumatic. Right Ear: Tympanic membrane normal.      Left Ear: Tympanic membrane normal.      Nose: Nose normal.      Mouth/Throat:      Mouth: Mucous membranes are moist.   Cardiovascular:      Rate and Rhythm: Normal rate and regular rhythm. Pulses: Normal pulses. Heart sounds: Normal heart sounds. Pulmonary:      Effort: Pulmonary effort is normal.      Breath sounds: Normal breath sounds. Abdominal:      General: Abdomen is flat. Palpations: Abdomen is soft. Musculoskeletal:      Cervical back: Normal range of motion and neck supple. Skin:     General: Skin is warm and dry. Neurological:      General: No focal deficit present. Mental Status: She is alert and oriented to person, place, and time. Mental status is at baseline. Cranial Nerves: No cranial nerve deficit. Motor: No weakness. Deep Tendon Reflexes: Reflexes normal.   Psychiatric:         Mood and Affect: Mood normal.         ASSESSMENT and PLAN  Diagnoses and all orders for this visit:    1. Graves disease,followed by Endo  -     SED RATE (ESR); Future  -     C REACTIVE PROTEIN, QT; Future    2. Headache disorder,trial of prn advil    3. Postpartum hypertension,bp satisfactory  -     METABOLIC PANEL, COMPREHENSIVE; Future  -     CBC WITH AUTOMATED DIFF; Future  -     CHOLESTEROL, TOTAL; Future  -     AMB POC URINALYSIS DIP STICK AUTO W/O MICRO    4. Encounter for vitamin deficiency screening    5. Family history of diabetes mellitus  -     HEMOGLOBIN A1C WITH EAG; Future    6.  Nocturia x 4;recommend caffeine reduction and fluid restriction after supper

## 2021-09-03 LAB
ALBUMIN SERPL-MCNC: 5 G/DL (ref 3.9–5)
ALBUMIN/GLOB SERPL: 2.1 {RATIO} (ref 1.2–2.2)
ALP SERPL-CCNC: 67 IU/L (ref 48–121)
ALT SERPL-CCNC: 11 IU/L (ref 0–32)
AST SERPL-CCNC: 14 IU/L (ref 0–40)
BASOPHILS # BLD AUTO: 0 X10E3/UL (ref 0–0.2)
BASOPHILS NFR BLD AUTO: 0 %
BILIRUB SERPL-MCNC: 0.5 MG/DL (ref 0–1.2)
BUN SERPL-MCNC: 14 MG/DL (ref 6–20)
BUN/CREAT SERPL: 22 (ref 9–23)
CALCIUM SERPL-MCNC: 9.5 MG/DL (ref 8.7–10.2)
CHLORIDE SERPL-SCNC: 107 MMOL/L (ref 96–106)
CHOLEST SERPL-MCNC: 161 MG/DL (ref 100–199)
CO2 SERPL-SCNC: 23 MMOL/L (ref 20–29)
CREAT SERPL-MCNC: 0.65 MG/DL (ref 0.57–1)
EOSINOPHIL # BLD AUTO: 0.1 X10E3/UL (ref 0–0.4)
EOSINOPHIL NFR BLD AUTO: 2 %
ERYTHROCYTE [DISTWIDTH] IN BLOOD BY AUTOMATED COUNT: 12.6 % (ref 11.7–15.4)
GLOBULIN SER CALC-MCNC: 2.4 G/DL (ref 1.5–4.5)
GLUCOSE SERPL-MCNC: 81 MG/DL (ref 65–99)
HCT VFR BLD AUTO: 41.1 % (ref 34–46.6)
HGB BLD-MCNC: 13.5 G/DL (ref 11.1–15.9)
IMM GRANULOCYTES # BLD AUTO: 0 X10E3/UL (ref 0–0.1)
IMM GRANULOCYTES NFR BLD AUTO: 1 %
LYMPHOCYTES # BLD AUTO: 2 X10E3/UL (ref 0.7–3.1)
LYMPHOCYTES NFR BLD AUTO: 42 %
MCH RBC QN AUTO: 28.2 PG (ref 26.6–33)
MCHC RBC AUTO-ENTMCNC: 32.8 G/DL (ref 31.5–35.7)
MCV RBC AUTO: 86 FL (ref 79–97)
MONOCYTES # BLD AUTO: 0.4 X10E3/UL (ref 0.1–0.9)
MONOCYTES NFR BLD AUTO: 8 %
NEUTROPHILS # BLD AUTO: 2.3 X10E3/UL (ref 1.4–7)
NEUTROPHILS NFR BLD AUTO: 47 %
PLATELET # BLD AUTO: 292 X10E3/UL (ref 150–450)
POTASSIUM SERPL-SCNC: 4.1 MMOL/L (ref 3.5–5.2)
PROT SERPL-MCNC: 7.4 G/DL (ref 6–8.5)
RBC # BLD AUTO: 4.79 X10E6/UL (ref 3.77–5.28)
SODIUM SERPL-SCNC: 142 MMOL/L (ref 134–144)
WBC # BLD AUTO: 4.7 X10E3/UL (ref 3.4–10.8)

## 2021-09-10 DIAGNOSIS — E05.00 GRAVES DISEASE: ICD-10-CM

## 2021-09-18 LAB
CRP SERPL-MCNC: 8 MG/L (ref 0–10)
ERYTHROCYTE [SEDIMENTATION RATE] IN BLOOD BY WESTERGREN METHOD: 2 MM/HR (ref 0–32)
EST. AVERAGE GLUCOSE BLD GHB EST-MCNC: 108 MG/DL
HBA1C MFR BLD: 5.4 % (ref 4.8–5.6)

## 2021-09-21 DIAGNOSIS — R51.9 HEADACHE DISORDER: Primary | ICD-10-CM

## 2021-09-21 RX ORDER — RIZATRIPTAN BENZOATE 5 MG/1
5 TABLET, ORALLY DISINTEGRATING ORAL ONCE
Qty: 5 TABLET | Refills: 1 | Status: SHIPPED | OUTPATIENT
Start: 2021-09-21 | End: 2021-09-21

## 2021-09-24 LAB
T4 FREE SERPL-MCNC: 1.11 NG/DL (ref 0.82–1.77)
THYROGLOB AB SERPL-ACNC: 7.4 IU/ML (ref 0–0.9)
THYROPEROXIDASE AB SERPL-ACNC: 88 IU/ML (ref 0–34)
TSH RECEP AB SER-ACNC: <1.1 IU/L (ref 0–1.75)
TSH SERPL DL<=0.005 MIU/L-ACNC: 3.23 UIU/ML (ref 0.45–4.5)

## 2021-09-25 RX ORDER — METHIMAZOLE 5 MG/1
TABLET ORAL
Qty: 30 TABLET | Refills: 11
Start: 2021-09-25 | End: 2021-10-28

## 2021-10-25 DIAGNOSIS — E05.00 GRAVES DISEASE: ICD-10-CM

## 2021-10-27 LAB
T4 FREE SERPL-MCNC: 1.27 NG/DL (ref 0.82–1.77)
TSH SERPL DL<=0.005 MIU/L-ACNC: 4.66 UIU/ML (ref 0.45–4.5)

## 2021-10-28 RX ORDER — METHIMAZOLE 5 MG/1
TABLET ORAL
Qty: 30 TABLET | Refills: 11
Start: 2021-10-28 | End: 2021-12-10

## 2021-11-08 DIAGNOSIS — F41.8 DEPRESSION WITH ANXIETY: Primary | ICD-10-CM

## 2021-11-26 DIAGNOSIS — E05.00 GRAVES DISEASE: ICD-10-CM

## 2021-12-07 LAB
T4 FREE SERPL-MCNC: 1.03 NG/DL (ref 0.82–1.77)
THYROGLOB AB SERPL-ACNC: 3.6 IU/ML (ref 0–0.9)
THYROPEROXIDASE AB SERPL-ACNC: 64 IU/ML (ref 0–34)
TSH RECEP AB SER-ACNC: <1.1 IU/L (ref 0–1.75)
TSH SERPL DL<=0.005 MIU/L-ACNC: 2.52 UIU/ML (ref 0.45–4.5)

## 2021-12-10 ENCOUNTER — OFFICE VISIT (OUTPATIENT)
Dept: ENDOCRINOLOGY | Age: 31
End: 2021-12-10
Payer: COMMERCIAL

## 2021-12-10 VITALS
DIASTOLIC BLOOD PRESSURE: 78 MMHG | WEIGHT: 113.2 LBS | HEART RATE: 79 BPM | SYSTOLIC BLOOD PRESSURE: 112 MMHG | HEIGHT: 60 IN | BODY MASS INDEX: 22.23 KG/M2

## 2021-12-10 DIAGNOSIS — E05.00 GRAVES DISEASE: Primary | ICD-10-CM

## 2021-12-10 PROCEDURE — 99214 OFFICE O/P EST MOD 30 MIN: CPT | Performed by: INTERNAL MEDICINE

## 2021-12-10 RX ORDER — METHIMAZOLE 5 MG/1
TABLET ORAL
Qty: 30 TABLET | Refills: 11
Start: 2021-12-10 | End: 2022-02-01

## 2021-12-10 NOTE — PATIENT INSTRUCTIONS
1) TSH is a thyroid test.  Your level is 2.5 which is normal but just above goal of 0.45-2.0. This test goes opposite of your thyroid dose and suggests your dose of methimazole is working well but slightly more than you need. I will decrease your dose to 1 whole tab one a week or 1/2 tab 2 days a week and you can decide. I updated your med list but did not send a new prescription to your pharmacy on file that has been filling this med. 2) Your TPO and thyroglobulin antibody levels, which are tests for Hashimoto's are both still elevated but lower than in August so your immune system is less active than at last check. Your TSH receptor antibody, which is a test for Grave's disease remains undetectable. 3) If you continue to fluctuate between hyper and hypothyroidism, it will be worth considering surgery to remove your thyroid which is the target of the antibodies that are driving the flip between these two conditions. 4) Repeat your labs in 6 weeks and based on the results, we'll determine when to check again.

## 2021-12-10 NOTE — PROGRESS NOTES
Chief Complaint   Patient presents with    Thyroid Problem     pcp and pharmacy confirmed     History of Present Illness: Rachel Strong is a 28 y.o. female here for follow up of thyroid. Since June has decreased her Methimazole down to 1/2 tab 3x/week since the end of October. May rarely miss a dose and tries to take the next day if she forgets. Does still feel fluttering several times a week but has had this symptom when hyper and hypothyroid and when normal.  No chest pain or pressure. No tremors. Can fluctuate between feeling hot and cold but mostly stays cold. Bowels are mostly regular and periods tend to be a little more spaced out closer to 30-34 days when hypo and closer together closer to 28-30 days when hyper. No brittle nails. Does have some hair loss that is stable. Weight is 113 today up from 106 at last in person visit in 3/19. Has had more left eye bulging since last visit and saw Dr. Lam Panchal and this is being watched for now. Not having too much blurry vision or double vision but does have some tearing. Current Outpatient Medications   Medication Sig    methIMAzole (TAPAZOLE) 5 mg tablet Take 1/2 tab 3x/week on Tues, Thurs, and Sat--delete the 10 mg tabs from profile--Dose change 10/28/21--updated med list--did not send prescription to the pharmacy     No current facility-administered medications for this visit.      Allergies   Allergen Reactions    Pcn [Penicillins] Unknown (comments)    Sulfa (Sulfonamide Antibiotics) Rash     A.s child     Review of Systems: PER HPI    Physical Examination:  Blood pressure 112/78, pulse 79, height 5' (1.524 m), weight 113 lb 3.2 oz (51.3 kg), not currently breastfeeding.  - General: pleasant, no distress, good eye contact   - Eyes: (+) mild left eye proptosis without any scleral injection or lid swelling  - Neck: (+) small goiter, no thyroid bruits  - Cardiovascular: regular, normal rate, nl s1 and s2, no m/r/g   - Respiratory: clear to auscultation bilaterally   - Integumentary: skin is normal, no edema  - Neurological: reflexes 2+ at biceps, no tremors  - Psychiatric: normal mood and affect    Data Reviewed:   Component      Latest Ref Rng & Units 12/6/2021 12/6/2021 12/6/2021 12/6/2021           9:53 AM  9:53 AM  9:53 AM  9:53 AM   Thyroid peroxidase Ab      0 - 34 IU/mL   64 (H)    Thyroglobulin Ab      0.0 - 0.9 IU/mL   3.6 (H)    Thyrotropin Receptor Ab, serum      0.00 - 1.75 IU/L    <1.10   TSH      0.450 - 4.500 uIU/mL  2.520     T4, Free      0.82 - 1.77 ng/dL 1.03          Assessment/Plan:     1. Grave's disease: She has always been told she has normal thyroid function and delivered her son, Nate Pinto, on 12/21/15 and after delivery developed hypertension and needed metoprolol. Had her thyroid levels checked after delivery and TSH was normal but saw Dr. Enoch Black on 5/18/16 and TSH was low at 0.012 and T4 was high at 13.3. Repeat labs with me in 5/16 showed TSH 0.009, FT4 3.27 and T3 263 and TRAb of 1.97 and TPO ab of 43 and I started her on MMI 30 mg daily. TSH up to 0.013 and FT4/T3 normal in 6/16 so decreased to 20 mg daily and TSH up to 8.93 in 7/16 so decreased to 10 mg daily and TSH up to 33 in 8/16 so decreased to 5 mg daily. TSH normal at 0.61 and FT4 1.34 and T3 151 in 10/16 so kept her dose the same. Then found out she was pregnant and TSH 3.47 in 11/16 and changed to PTU 50 mg 1 tab in am and 1/2 tab in pm during 1st trimester and then changed back to MMI 10 mg 1/2 tab daily when she was in 2nd trimester. TSH 4.73 and TRAb 2.75 and TPO ab 29 in 1/17 and decreased to 1/2 tab 4x/week,  TSH 2.32 in 3/17 and decreased to 1/2 tab 3x/week and TSH 1.76 and TRAb 0.83 in 4/17 and kept her dose the same through delivery in 6/17.   TSH 3.76 and TRAb < 0.50 in 9/17 so decreased to 1/2 tab 2x/week for 3 months and TSH 3.35 in 12/17 so decreased to 1/2 tab once a week and TSH down to 0.439 and TRAb up to 1.99 in 3/18 so went back to 1/2 tab 3x/week. TSH 3.88 in 6/18 so decreased back to 1/2 of 10 mg tab 2x/week and TSH up to 5.8 in 9/18 so decreased to 1/2 tab once a week and TSH 2.93 and TRAb 0.83 in 1/19 so kept dose the same. TSH 3.86 and TRAb <0.50 in 3/19 so stopped the methimazole as her Grave's antibody test was undetectable and she was pregnant. She miscarried in 4/19 and then became pregnant again in 8/19 and her thyroid tests were normal during pregnancy and remained off medication. She delivered in 4/20 and her TSH was normal in 6/20 at 2.8. She contacted me in 10/20 that she felt off and her TSH was 48 and TRAb was high at 13.5 so I started levothyroxine 88 mcg daily but she started developing hyperthyroid symptoms on this so I decreased to 75 mcg daily but still had hyperthyroid symptoms and stopped this on 11/22/20. Her TSH was low at 0.096 and FT4 normal at 1.33 on 11/25/20 so it's possible she has developed post-partum thyroiditis and TSH normal at 2.13 and TRAb 14 on 12/31/20 so held on any medication. TSH down to 0.065 and TRAb 11.1 in 2/21 and began methimazole 10 mg daily. TSH 1.28 on 4/1/21 so decreased to 6 tabs/week. TSH up to 6.39 and TRAb 3.05 on 5/11/21 so decreased to 5 mg daily. TSH 6.07 and TRAb 1.66 in 6/21 so decreased to 2.5 mg daily. TSH 2.68 and TPO ab 87, TG ab 7.7 in 8/21 so decreased to 2.5 mg 5x/week and TSH 3.23 and TPO ab 88, TG ab 7.4 and TRAb < 1.1 in 9/21 so decreased to 2.5 mg 4x/week and TSH 4.66 in 10/21 so decreased to 2.5 mg 3x/week. TSH 2.52 and TPO ab 64, TG ab 3.6 and TRAb < 1.1 in 12/21 so decreased to 2.5 mg 2x/week. Explained that because she has had fluctuating hyper and hypothyroidism over the years, she may eventually benefit from thyroidectomy to eliminate the source of the antibody target that are causing her to flip between these 2 thyroid states and she wants to hold on this for now.   - decrease methimazole to 2.5 mg 2x/week or 5 mg once a week  - check TSH and free T4 and TPO and TG ab in 6 weeks            Patient Instructions   1) TSH is a thyroid test.  Your level is 2.5 which is normal but just above goal of 0.45-2.0. This test goes opposite of your thyroid dose and suggests your dose of methimazole is working well but slightly more than you need. I will decrease your dose to 1 whole tab one a week or 1/2 tab 2 days a week and you can decide. I updated your med list but did not send a new prescription to your pharmacy on file that has been filling this med. 2) Your TPO and thyroglobulin antibody levels, which are tests for Hashimoto's are both still elevated but lower than in August so your immune system is less active than at last check. Your TSH receptor antibody, which is a test for Grave's disease remains undetectable. 3) If you continue to fluctuate between hyper and hypothyroidism, it will be worth considering surgery to remove your thyroid which is the target of the antibodies that are driving the flip between these two conditions. 4) Repeat your labs in 6 weeks and based on the results, we'll determine when to check again. Follow-up and Dispositions    · Return in about 6 months (around 6/10/2022). Copy sent to:  Dr. Gopi Hayes via [facility]  Dr. Bob Coles    Lab follow up: 02/01/22    Component      Latest Ref Rng & Units 1/28/2022 1/28/2022 1/28/2022           9:53 AM  9:53 AM  9:53 AM   Thyroid peroxidase Ab      0 - 34 IU/mL 58 (H)     Thyroglobulin Ab      0.0 - 0.9 IU/mL 3.3 (H)     TSH      0.450 - 4.500 uIU/mL   3.300   T4, Free      0.82 - 1.77 ng/dL  1.03      Sent her the following message through [employer]:  TSH is a thyroid test.  Your level is 3.3 which is normal but still above goal of 0.45-2.0. This test goes opposite of your thyroid dose and suggests your dose of methimazole is working well but slightly more than you need. I will decrease your dose to 1/2 tab once a week.   I updated your med list but did not send a new prescription to your pharmacy on file that has been filling this med.  -------------------------------------------------------------------------------------------------------------------  Your thyroid peroxidase and thyroglobulin antibody levels are still elevated but lower than last time. -------------------------------------------------------------------------------------------------------------------  I put an order directly into the [facility] system to repeat your labs in 6 weeks so just ask for the order under my name and you will receive a courtesy reminder through [employer] to have these drawn and I'll be in touch with the results. Lab follow up: 03/19/22    Component      Latest Ref Rng & Units 3/17/2022 3/17/2022 3/17/2022           1:11 PM  1:11 PM  1:11 PM   Thyroid peroxidase Ab      0 - 34 IU/mL   68 (H)   Thyroglobulin Ab      0.0 - 0.9 IU/mL   3.5 (H)   TSH      0.450 - 4.500 uIU/mL  3.290    T4, Free      0.82 - 1.77 ng/dL 1.23       Sent her the following message through [employer]:  Your TSH and free T4 are both normal so your methimazole is working well. However, your thyroid peroxidase and thyroglobulin antibody levels are slightly higher than last time so I will keep your methimazole the same at 1/2 tab once a week until you come back in June.  -------------------------------------------------------------------------------------------------------------------  I put an order directly into the [facility] system to repeat your labs in the 1-2 weeks prior to your next visit so just ask for the order under my name and you will receive a courtesy reminder through [employer] to have these drawn.     Lab follow up: 04/29/22  Component      Latest Ref Rng & Units 4/27/2022 4/27/2022          10:05 AM 10:05 AM   T4, Free      0.82 - 1.77 ng/dL  0.98   TSH      0.450 - 4.500 uIU/mL 2.610      Sent her the following message through [employer]:  TSH is a thyroid test.  Your level is 2.61 which is still normal but above goal of 0.45-2.0 though improved from 3.29 last month. This test goes opposite of your thyroid dose and suggests your dose of methimazole is still working well. If you are feeling HYPOthyroid symtpoms, you are welcome to decrease your dose to 1/2 tab every 10 days on the 10th, 20th, and 30th of each month until you come back in June. I updated your med list but did not send a new prescription to your pharmacy on file that has been filling this med.

## 2022-01-21 DIAGNOSIS — E05.00 GRAVES DISEASE: ICD-10-CM

## 2022-01-29 LAB
T4 FREE SERPL-MCNC: 1.03 NG/DL (ref 0.82–1.77)
THYROGLOB AB SERPL-ACNC: 3.3 IU/ML (ref 0–0.9)
THYROPEROXIDASE AB SERPL-ACNC: 58 IU/ML (ref 0–34)
TSH SERPL-ACNC: 3.3 UIU/ML (ref 0.45–4.5)

## 2022-02-01 RX ORDER — METHIMAZOLE 5 MG/1
TABLET ORAL
Qty: 30 TABLET | Refills: 11
Start: 2022-02-01 | End: 2022-04-29

## 2022-03-15 DIAGNOSIS — E05.00 GRAVES DISEASE: ICD-10-CM

## 2022-03-18 LAB
T4 FREE SERPL-MCNC: 1.23 NG/DL (ref 0.82–1.77)
THYROGLOB AB SERPL-ACNC: 3.5 IU/ML (ref 0–0.9)
THYROPEROXIDASE AB SERPL-ACNC: 68 IU/ML (ref 0–34)
TSH SERPL DL<=0.005 MIU/L-ACNC: 3.29 UIU/ML (ref 0.45–4.5)

## 2022-03-19 PROBLEM — Z34.90 PREGNANCY: Status: ACTIVE | Noted: 2020-04-05

## 2022-03-29 DIAGNOSIS — J32.0 MAXILLARY SINUSITIS, UNSPECIFIED CHRONICITY: Primary | ICD-10-CM

## 2022-03-29 DIAGNOSIS — L30.9 ECZEMA, UNSPECIFIED TYPE: ICD-10-CM

## 2022-03-29 RX ORDER — AZITHROMYCIN 250 MG/1
TABLET, FILM COATED ORAL
Qty: 6 TABLET | Refills: 0 | Status: SHIPPED | OUTPATIENT
Start: 2022-03-29 | End: 2022-05-02 | Stop reason: ALTCHOICE

## 2022-03-29 RX ORDER — TRIAMCINOLONE ACETONIDE 1 MG/G
OINTMENT TOPICAL 2 TIMES DAILY
Qty: 80 G | Refills: 5 | Status: SHIPPED | OUTPATIENT
Start: 2022-03-29 | End: 2022-06-10

## 2022-03-31 ENCOUNTER — HOSPITAL ENCOUNTER (EMERGENCY)
Age: 32
Discharge: HOME | End: 2022-03-31
Attending: EMERGENCY MEDICINE
Payer: COMMERCIAL

## 2022-03-31 VITALS
HEART RATE: 73 BPM | DIASTOLIC BLOOD PRESSURE: 89 MMHG | WEIGHT: 111.99 LBS | TEMPERATURE: 98.1 F | BODY MASS INDEX: 21.99 KG/M2 | SYSTOLIC BLOOD PRESSURE: 145 MMHG | RESPIRATION RATE: 20 BRPM | HEIGHT: 60 IN | OXYGEN SATURATION: 100 %

## 2022-03-31 DIAGNOSIS — J04.0 VIRAL LARYNGITIS: Primary | ICD-10-CM

## 2022-03-31 DIAGNOSIS — B97.89 VIRAL LARYNGITIS: Primary | ICD-10-CM

## 2022-03-31 LAB — DEPRECATED S PYO AG THROAT QL EIA: NEGATIVE

## 2022-03-31 PROCEDURE — 87880 STREP A ASSAY W/OPTIC: CPT

## 2022-03-31 PROCEDURE — 74011250637 HC RX REV CODE- 250/637: Performed by: EMERGENCY MEDICINE

## 2022-03-31 PROCEDURE — 99283 EMERGENCY DEPT VISIT LOW MDM: CPT

## 2022-03-31 PROCEDURE — 87070 CULTURE OTHR SPECIMN AEROBIC: CPT

## 2022-03-31 RX ORDER — DEXAMETHASONE 1 MG/1
1 TABLET ORAL 2 TIMES DAILY WITH MEALS
Qty: 6 TABLET | Refills: 0 | Status: SHIPPED | OUTPATIENT
Start: 2022-03-31 | End: 2022-03-31 | Stop reason: ALTCHOICE

## 2022-03-31 RX ORDER — DEXAMETHASONE 0.5 MG/5ML
2 SOLUTION ORAL
Status: DISCONTINUED | OUTPATIENT
Start: 2022-03-31 | End: 2022-03-31

## 2022-03-31 RX ORDER — NAPROXEN 500 MG/1
500 TABLET ORAL 2 TIMES DAILY WITH MEALS
Qty: 20 TABLET | Refills: 0 | Status: SHIPPED | OUTPATIENT
Start: 2022-03-31 | End: 2022-03-31 | Stop reason: ALTCHOICE

## 2022-03-31 RX ORDER — IBUPROFEN 600 MG/1
600 TABLET ORAL
Status: COMPLETED | OUTPATIENT
Start: 2022-03-31 | End: 2022-03-31

## 2022-03-31 RX ORDER — TRIPROLIDINE/PSEUDOEPHEDRINE 2.5MG-60MG
400 TABLET ORAL
Qty: 240 ML | Refills: 0 | Status: SHIPPED | OUTPATIENT
Start: 2022-03-31 | End: 2022-04-03

## 2022-03-31 RX ORDER — DEXAMETHASONE 0.5 MG/5ML
1 ELIXIR ORAL 2 TIMES DAILY
Qty: 60 ML | Refills: 0 | Status: SHIPPED | OUTPATIENT
Start: 2022-03-31 | End: 2022-04-03

## 2022-03-31 RX ADMIN — IBUPROFEN 600 MG: 600 TABLET ORAL at 07:02

## 2022-03-31 NOTE — ED PROVIDER NOTES
EMERGENCY DEPARTMENT HISTORY AND PHYSICAL EXAM      Date: 3/31/2022  Patient Name: Dalia Sparks  Patient Age and Sex: 28 y.o. female     History of Presenting Illness     Chief Complaint   Patient presents with    Sore Throat     Difficulty breathing with throat tightness starting last night, reports sinus problems for the past two weeks. Woke up feeling anxious r/t breathing. History Provided By: Patient    HPI: Dalia Sparks is a 29-year-old female presenting with sore throat. Patient states that last night started to have some throat tightness as well as lost her voice. In last couple of days to weeks has been having sinus issues with some green discharge her primary care doctor put her on Z-Chas starting yesterday. This morning woke up very anxious with difficulty breathing and states that she does have a history of anxiety and panic attacks. Patient states that she has been taking Sudafed only at night. Denies taking anything for the sore throat. There are no other complaints, changes, or physical findings at this time. PCP: Chintan Dennis MD    No current facility-administered medications on file prior to encounter. Current Outpatient Medications on File Prior to Encounter   Medication Sig Dispense Refill    guaiFENesin-dextromethorphan SR (Mucinex DM) 600-30 mg per tablet Take 1 Tablet by mouth two (2) times a day. 20 Tablet 3    azithromycin (ZITHROMAX) 250 mg tablet Take 2 tablets today, then take 1 tablet daily 6 Tablet 0    triamcinolone acetonide (KENALOG) 0.1 % ointment Apply  to affected area two (2) times a day.  use thin layer 80 g 5    methIMAzole (TAPAZOLE) 5 mg tablet Take 1/2 tab once a week--delete the 10 mg tabs from profile--Dose change 02/01/22--updated med list--did not send prescription to the pharmacy 30 Tablet 11       Past History     Past Medical History:  Past Medical History:   Diagnosis Date    Abnormal Papanicolaou smear of cervix     colpo normal    Anemia     Asthma     Complication of anesthesia     \"low tolerance\"    Miscarriage     PCOS (polycystic ovarian syndrome)     Polycystic disease, ovaries     questionable    Postpartum hypertension 12/31/2015    also 2017    Psychiatric problem     depression/ anxiety    Thyroid activity decreased        Past Surgical History:  Past Surgical History:   Procedure Laterality Date    HX GYN      ectopic pregnancy    HX HEENT      wisdom teeth extraction    HX OTHER SURGICAL         Family History:  Family History   Problem Relation Age of Onset    Thyroid Disease Maternal Grandmother         hypothyroidism    Diabetes Father     Heart Disease Father        Social History:  Social History     Tobacco Use    Smoking status: Never Smoker    Smokeless tobacco: Never Used   Vaping Use    Vaping Use: Never used   Substance Use Topics    Alcohol use: No    Drug use: No       Allergies: Allergies   Allergen Reactions    Pcn [Penicillins] Unknown (comments)    Sulfa (Sulfonamide Antibiotics) Rash     A.s child         Review of Systems   Review of Systems   Constitutional: Negative for chills and fever. HENT: Positive for congestion, sinus pressure, sinus pain and sore throat. Respiratory: Positive for cough. Negative for shortness of breath. Cardiovascular: Negative for chest pain. Gastrointestinal: Negative for abdominal pain, constipation, diarrhea, nausea and vomiting. Genitourinary: Negative for dysuria, frequency and hematuria. Neurological: Negative for weakness and numbness. All other systems reviewed and are negative. Physical Exam   Physical Exam  Vitals and nursing note reviewed. Constitutional:       Appearance: She is well-developed. HENT:      Head: Normocephalic and atraumatic.       Nose: Nose normal.      Mouth/Throat:      Mouth: Mucous membranes are moist.      Comments: Posterior pharynx is slightly erythematous but no signs of petechiae, edema, abscess. Patient does have a sore voice and hoarse voice. Eyes:      Extraocular Movements: Extraocular movements intact. Conjunctiva/sclera: Conjunctivae normal.   Cardiovascular:      Rate and Rhythm: Normal rate and regular rhythm. Pulmonary:      Effort: Pulmonary effort is normal. No respiratory distress. Breath sounds: Normal breath sounds. Abdominal:      General: There is no distension. Palpations: Abdomen is soft. Tenderness: There is no abdominal tenderness. Musculoskeletal:         General: Normal range of motion. Cervical back: Normal range of motion and neck supple. Skin:     General: Skin is warm and dry. Neurological:      General: No focal deficit present. Mental Status: She is alert and oriented to person, place, and time. Mental status is at baseline. Psychiatric:         Mood and Affect: Mood normal.          Diagnostic Study Results     Labs -   No results found for this or any previous visit (from the past 12 hour(s)). Radiologic Studies -   No orders to display     CT Results  (Last 48 hours)    None        CXR Results  (Last 48 hours)    None            Medical Decision Making   I am the first provider for this patient. I reviewed the vital signs, available nursing notes, past medical history, past surgical history, family history and social history. Vital Signs-Reviewed the patient's vital signs. Patient Vitals for the past 12 hrs:   Temp Pulse Resp BP SpO2   03/31/22 0653     100 %   03/31/22 0640 98.1 °F (36.7 °C) 73 20 (!) 145/89 100 %       Records Reviewed: Nursing Notes and Old Medical Records    Provider Notes (Medical Decision Making):   Patient presenting with loss of voice, hoarse throat and sore throat. Most likely all of viral laryngitis. Posterior pharynx looks pretty normal with no signs of strep throat. We will do a strep screen anyway. Will give her NSAIDs as well as Decadron to help with the pain and swelling. Counseled on managing URI symptoms    ED Course:   Initial assessment performed. The patients presenting problems have been discussed, and they are in agreement with the care plan formulated and outlined with them. I have encouraged them to ask questions as they arise throughout their visit. Critical Care Time:   0    Disposition:  Discharge Note:  The patient has been re-evaluated and is ready for discharge. Reviewed available results with patient. Counseled patient on diagnosis and care plan. Patient has expressed understanding, and all questions have been answered. Patient agrees with plan and agrees to follow up as recommended, or to return to the ED if their symptoms worsen. Discharge instructions have been provided and explained to the patient, along with reasons to return to the ED. PLAN:  Current Discharge Medication List      START taking these medications    Details   naproxen (Naprosyn) 500 mg tablet Take 1 Tablet by mouth two (2) times daily (with meals) for 10 days. Qty: 20 Tablet, Refills: 0  Start date: 3/31/2022, End date: 4/10/2022      dexAMETHasone (DECADRON) 1 mg tablet Take 1 Tablet by mouth two (2) times daily (with meals) for 3 days. Qty: 6 Tablet, Refills: 0  Start date: 3/31/2022, End date: 4/3/2022           2. Follow-up Information     Follow up With Specialties Details Why Contact Info    Gilson Burks MD Family Medicine  As needed [address]  [phone]          3. Return to ED if worse     Diagnosis     Clinical Impression:   1. Viral laryngitis        Attestations:    Sp Moscoso M.D. Please note that this dictation was completed with [email or web address], the computer voice recognition software. Quite often unanticipated grammatical, syntax, homophones, and other interpretive errors are inadvertently transcribed by the computer software. Please disregard these errors.   Please excuse any errors that have escaped final proofreading. Thank you.

## 2022-04-02 LAB
BACTERIA SPEC CULT: NORMAL
SERVICE CMNT-IMP: NORMAL

## 2022-04-21 DIAGNOSIS — E05.00 GRAVES DISEASE: Primary | ICD-10-CM

## 2022-04-28 LAB
T4 FREE SERPL-MCNC: 0.98 NG/DL (ref 0.82–1.77)
TSH SERPL DL<=0.005 MIU/L-ACNC: 2.61 UIU/ML (ref 0.45–4.5)

## 2022-04-29 RX ORDER — METHIMAZOLE 5 MG/1
TABLET ORAL
Qty: 30 TABLET | Refills: 11
Start: 2022-04-29 | End: 2022-09-17

## 2022-05-02 ENCOUNTER — OFFICE VISIT (OUTPATIENT)
Dept: FAMILY MEDICINE CLINIC | Age: 32
End: 2022-05-02
Payer: COMMERCIAL

## 2022-05-02 VITALS
WEIGHT: 112.8 LBS | BODY MASS INDEX: 22.15 KG/M2 | OXYGEN SATURATION: 99 % | SYSTOLIC BLOOD PRESSURE: 106 MMHG | TEMPERATURE: 98.5 F | DIASTOLIC BLOOD PRESSURE: 80 MMHG | HEART RATE: 86 BPM | HEIGHT: 60 IN | RESPIRATION RATE: 18 BRPM

## 2022-05-02 DIAGNOSIS — L30.8 OTHER ECZEMA: ICD-10-CM

## 2022-05-02 DIAGNOSIS — L84 FOOT CALLUS: Primary | ICD-10-CM

## 2022-05-02 PROCEDURE — 99213 OFFICE O/P EST LOW 20 MIN: CPT | Performed by: FAMILY MEDICINE

## 2022-05-02 RX ORDER — TRIAMCINOLONE ACETONIDE 5 MG/G
OINTMENT TOPICAL 2 TIMES DAILY
Qty: 30 G | Refills: 0 | Status: SHIPPED | OUTPATIENT
Start: 2022-05-02 | End: 2022-06-10

## 2022-05-02 NOTE — PROGRESS NOTES
HISTORY OF PRESENT ILLNESS  Imelda Herrera is a 28 y.o. female. c/o painful tender growth lateral plantar lm foot,gradually worsening,no apparent injury,worried about possible fb  Foot Pain  The history is provided by the patient. This is a chronic problem. The problem occurs daily. The problem has been gradually worsening. Skin Problem  The history is provided by the patient. This is a chronic problem. The problem occurs daily. The problem has been gradually worsening. Review of Systems   Skin: Positive for itching and rash. Physical Exam  Constitutional:       Appearance: Normal appearance. She is normal weight. HENT:      Head: Normocephalic and atraumatic. Cardiovascular:      Pulses: Normal pulses. Heart sounds: Normal heart sounds. Pulmonary:      Effort: Pulmonary effort is normal.      Breath sounds: Normal breath sounds. Abdominal:      General: Abdomen is flat. Palpations: Abdomen is soft. Skin:     General: Skin is warm and dry. Findings: Lesion present. Comments: Dry scaly patches dorsal feet    3 x 3 mm smooth wart like growth lateral plantar foot  Shaved flat with scalpel   Neurological:      Mental Status: She is alert. Psychiatric:         Mood and Affect: Mood normal.         ASSESSMENT and PLAN  Diagnoses and all orders for this visit:    1. Foot callus,shaved  -     XR FOOT LT MIN 3 V; Future    2. Other eczema ,dorsal feet  -     triamcinolone acetonide (KENALOG) 0.5 % ointment; Apply  to affected area two (2) times a day.  use thin layer

## 2022-05-02 NOTE — PROGRESS NOTES
Chief Complaint   Patient presents with    Foot Pain     Pt state has a painful knot on the heel of the L foot. 1. \"Have you been to the ER, urgent care clinic since your last visit? Hospitalized since your last visit? \" No    2. \"Have you seen or consulted any other health care providers outside of the [address] [place] [address] since your last visit? \" No     3. For patients aged 39-70: Has the patient had a colonoscopy / FIT/ Cologuard? NA - based on age      If the patient is female:    4. For patients aged 41-77: Has the patient had a mammogram within the past 2 years? NA - based on age or sex      11. For patients aged 21-65: Has the patient had a pap smear?  No    Health Maintenance Due   Topic Date Due    Hepatitis C Screening  Never done    COVID-19 Vaccine (1) Never done    Cervical cancer screen  Never done

## 2022-05-27 DIAGNOSIS — E05.00 GRAVES DISEASE: ICD-10-CM

## 2022-06-01 LAB
T4 FREE SERPL-MCNC: 1.16 NG/DL (ref 0.82–1.77)
THYROGLOB AB SERPL-ACNC: 2.5 IU/ML (ref 0–0.9)
THYROPEROXIDASE AB SERPL-ACNC: 83 IU/ML (ref 0–34)
TSH SERPL DL<=0.005 MIU/L-ACNC: 2.94 UIU/ML (ref 0.45–4.5)

## 2022-06-10 ENCOUNTER — OFFICE VISIT (OUTPATIENT)
Dept: ENDOCRINOLOGY | Age: 32
End: 2022-06-10
Payer: COMMERCIAL

## 2022-06-10 VITALS
DIASTOLIC BLOOD PRESSURE: 75 MMHG | HEIGHT: 60 IN | SYSTOLIC BLOOD PRESSURE: 115 MMHG | HEART RATE: 78 BPM | BODY MASS INDEX: 22.42 KG/M2 | WEIGHT: 114.2 LBS

## 2022-06-10 DIAGNOSIS — E05.00 GRAVES DISEASE: Primary | ICD-10-CM

## 2022-06-10 PROCEDURE — 99214 OFFICE O/P EST MOD 30 MIN: CPT | Performed by: INTERNAL MEDICINE

## 2022-06-10 NOTE — PATIENT INSTRUCTIONS
1) Your TSH (thyroid test) and free T4 are both normal so even though your TPO ab was slightly higher and your thyroglobulin ab was slightly lower, I recommend staying on 1/2 tab every 10 days for the next 3 months and then repeat your labs. Clear bilaterally, pupils equal, round and reactive to light.

## 2022-06-10 NOTE — PROGRESS NOTES
Chief Complaint   Patient presents with    Thyroid Problem     pcp and pharmacy confirmed     History of Present Illness: Manfred Khan is a 28 y.o. female here for follow up of thyroid. Weight up 1 lbs since last visit in 12/21. Has been taking 1/2 tab of MMI every 10 days since 4/29/22. Still having fatigue but unchanged since 12/21. Bowels are regular. No chest pain or palpitations. Occasionally can get some had tremors. No hair loss or brittle nails. Tends to stay cold but can get overheated easily when outside in the heat. Periods are mostly regular. Current Outpatient Medications   Medication Sig    methIMAzole (TAPAZOLE) 5 mg tablet Take 1/2 tab every 10 days--delete the 10 mg tabs from profile--Dose change 04/29/22--updated med list--did not send prescription to the pharmacy     No current facility-administered medications for this visit. Allergies   Allergen Reactions    Pcn [Penicillins] Unknown (comments)    Sulfa (Sulfonamide Antibiotics) Rash     A.s child     Review of Systems: PER HPI    Physical Examination:  Blood pressure 115/75, pulse 78, height 5' (1.524 m), weight 114 lb 3.2 oz (51.8 kg), not currently breastfeeding. General: pleasant, no distress, good eye contact   Neck: small goiter, no thyroid bruits  Cardiovascular: regular, normal rate, nl s1 and s2, no m/r/g   Respiratory: clear to auscultation bilaterally   Integumentary: skin is normal, no edema  Neurological: reflexes 2+ at biceps, no tremors  Psychiatric: normal mood and affect    Data Reviewed:   Component      Latest Ref Rng & Units 5/31/2022 5/31/2022 5/31/2022          10:37 AM 10:37 AM 10:37 AM   Thyroid peroxidase Ab      0 - 34 IU/mL 83 (H)     Thyroglobulin Ab      0.0 - 0.9 IU/mL 2.5 (H)     T4, Free      0.82 - 1.77 ng/dL   1.16   TSH      0.450 - 4.500 uIU/mL  2.940        Assessment/Plan:     1.  Grave's disease: She has always been told she has normal thyroid function and delivered her son, Yasemin Saenz, on 12/21/15 and after delivery developed hypertension and needed metoprolol. Had her thyroid levels checked after delivery and TSH was normal but saw Dr. Francisco Gonzalez on 5/18/16 and TSH was low at 0.012 and T4 was high at 13.3. Repeat labs with me in 5/16 showed TSH 0.009, FT4 3.27 and T3 263 and TRAb of 1.97 and TPO ab of 43 and I started her on MMI 30 mg daily. TSH up to 0.013 and FT4/T3 normal in 6/16 so decreased to 20 mg daily and TSH up to 8.93 in 7/16 so decreased to 10 mg daily and TSH up to 33 in 8/16 so decreased to 5 mg daily. TSH normal at 0.61 and FT4 1.34 and T3 151 in 10/16 so kept her dose the same. Then found out she was pregnant and TSH 3.47 in 11/16 and changed to PTU 50 mg 1 tab in am and 1/2 tab in pm during 1st trimester and then changed back to MMI 10 mg 1/2 tab daily when she was in 2nd trimester. TSH 4.73 and TRAb 2.75 and TPO ab 29 in 1/17 and decreased to 1/2 tab 4x/week,  TSH 2.32 in 3/17 and decreased to 1/2 tab 3x/week and TSH 1.76 and TRAb 0.83 in 4/17 and kept her dose the same through delivery in 6/17. TSH 3.76 and TRAb < 0.50 in 9/17 so decreased to 1/2 tab 2x/week for 3 months and TSH 3.35 in 12/17 so decreased to 1/2 tab once a week and TSH down to 0.439 and TRAb up to 1.99 in 3/18 so went back to 1/2 tab 3x/week. TSH 3.88 in 6/18 so decreased back to 1/2 of 10 mg tab 2x/week and TSH up to 5.8 in 9/18 so decreased to 1/2 tab once a week and TSH 2.93 and TRAb 0.83 in 1/19 so kept dose the same. TSH 3.86 and TRAb <0.50 in 3/19 so stopped the methimazole as her Grave's antibody test was undetectable and she was pregnant. She miscarried in 4/19 and then became pregnant again in 8/19 and her thyroid tests were normal during pregnancy and remained off medication. She delivered in 4/20 and her TSH was normal in 6/20 at 2.8.   She contacted me in 10/20 that she felt off and her TSH was 48 and TRAb was high at 13.5 so I started levothyroxine 88 mcg daily but she started developing hyperthyroid symptoms on this so I decreased to 75 mcg daily but still had hyperthyroid symptoms and stopped this on 11/22/20. Her TSH was low at 0.096 and FT4 normal at 1.33 on 11/25/20 so it's possible she has developed post-partum thyroiditis and TSH normal at 2.13 and TRAb 14 on 12/31/20 so held on any medication. TSH down to 0.065 and TRAb 11.1 in 2/21 and began methimazole 10 mg daily. TSH 1.28 on 4/1/21 so decreased to 6 tabs/week. TSH up to 6.39 and TRAb 3.05 on 5/11/21 so decreased to 5 mg daily. TSH 6.07 and TRAb 1.66 in 6/21 so decreased to 2.5 mg daily. TSH 2.68 and TPO ab 87, TG ab 7.7 in 8/21 so decreased to 2.5 mg 5x/week and TSH 3.23 and TPO ab 88, TG ab 7.4 and TRAb < 1.1 in 9/21 so decreased to 2.5 mg 4x/week and TSH 4.66 in 10/21 so decreased to 2.5 mg 3x/week. TSH 2.52 and TPO ab 64, TG ab 3.6 and TRAb < 1.1 in 12/21 so decreased to 2.5 mg 2x/week. TSH 3.3 and TPO ab 58 and TG ab 3.3 in 1/22 so decreased to 2.5 mg once a week. TSH 3.29, TPO ab 68 and TG ab 3.5 in 3/22 so kept dose the same. TSH 2.61 in 4/22 so decreased to 2.5 mg every 10 days. TSH 2.9 and TPO ab 83 and TG ab 2.5 in 5/22 so kept dose the same. Explained that because she has had fluctuating hyper and hypothyroidism over the years, she may eventually benefit from thyroidectomy to eliminate the source of the antibody target that are causing her to flip between these 2 thyroid states and she wants to hold on this for now. - cont methimazole 2.5 mg every 10 days  - check TSH and free T4 and TPO and TG ab and TSH receptor ab in 3 months and prior to next visit          Patient Instructions   1) Your TSH (thyroid test) and free T4 are both normal so even though your TPO ab was slightly higher and your thyroglobulin ab was slightly lower, I recommend staying on 1/2 tab every 10 days for the next 3 months and then repeat your labs. Follow-up and Dispositions    Return in about 6 months (around 12/10/2022). Copy sent to:  Dr. Kyleigh Lombardi via [facility]  Dr. Mcgee Favorite  Dr. Perez Robertson    Lab follow up: 09/17/22  Component      Latest Ref Rng & Units 9/15/2022 9/15/2022 9/15/2022 9/15/2022          10:38 AM 10:38 AM 10:38 AM 10:38 AM   Thyroid peroxidase Ab      0 - 34 IU/mL  157 (H)     Thyroglobulin Ab      0.0 - 0.9 IU/mL  2.6 (H)     T4, Free      0.82 - 1.77 ng/dL    0.99   TSH      0.450 - 4.500 uIU/mL   4.310    Thyrotropin Receptor Ab, serum      0.00 - 1.75 IU/L <1.10        Sent her the following message through [employer]:  TSH is a thyroid test.  Your level is 4.31 which is normal but above goal of 0.45-2.0. This test goes opposite of your thyroid dose and suggests your dose of methimazole is working well but more than you need. I will decrease your dose to 1/2 tab every 14 days until you come back in December. I updated your med list but did not send a new prescription to your pharmacy on file that has been filling this med.  -------------------------------------------------------------------------------------------------------------------  Your TPO ab ann marie from 83 to 157 and Thyroglobulin ab was slightly high at 2.6 up from 2.5. Your TSH receptor ab is still undetectable. Therefore your immune system is still quite active but I wonder if your antibodies are blocking your thyroid hormone production and causing you to more HYPOthyroid (slower metabolism) and this is the reason I am slightly decreasing your methimazole so I don't block your thyroid too much from making thyroid hormone.     Lab follow up: 12/01/22  Component      Latest Ref Rng & Units 11/29/2022/2022 11/29/2022 11/29/2022          12:00 AM 12:00 AM 12:00 AM   Thyroid peroxidase Ab      0 - 34 IU/mL 208 (H)     Thyroglobulin Ab      0.0 - 0.9 IU/mL 3.5 (H)     TSH      0.450 - 4.500 uIU/mL   6.260 (H)   T4, Free      0.82 - 1.77 ng/dL  0.91      Sent her the following message through [employer]:  TSH is a thyroid test.  Your level is 6.26 which is high and above goal of 0.45-2.0 and up from 4.3 at your last check. This test goes opposite of your thyroid dose and suggests your dose of methimazole is still working too well and I don't think you need the methimazole any longer and am wondering if you are flipping to HYPOthyroidism (slow metabolism) and may need to go back to levothyroxine again. Please stop the methimazole for the next month.  -------------------------------------------------------------------------------------------------------------------  Your TPO antibody has risen from 157 and 208 and your thyroglobulin antibody ann marie from 2.6 to 3.5 which means your immune system is more overactive than at last check. -------------------------------------------------------------------------------------------------------------------  Since you had to cancel your visit on 12/16/22, would you be able to reschedule for 1/3/23 at 12:10pm either in person or virtually and go and repeat your labs again prior to the visit using the [facility] under my name that I gave you back in June?

## 2022-09-10 DIAGNOSIS — E05.00 GRAVES DISEASE: ICD-10-CM

## 2022-09-16 LAB
T4 FREE SERPL-MCNC: 0.99 NG/DL (ref 0.82–1.77)
THYROGLOB AB SERPL-ACNC: 2.6 IU/ML (ref 0–0.9)
THYROPEROXIDASE AB SERPL-ACNC: 157 IU/ML (ref 0–34)
TSH RECEP AB SER-ACNC: <1.1 IU/L (ref 0–1.75)
TSH SERPL DL<=0.005 MIU/L-ACNC: 4.31 UIU/ML (ref 0.45–4.5)

## 2022-09-17 RX ORDER — METHIMAZOLE 5 MG/1
TABLET ORAL
Qty: 30 TABLET | Refills: 11
Start: 2022-09-17

## 2022-09-19 DIAGNOSIS — J02.0 PHARYNGITIS DUE TO STREPTOCOCCUS SPECIES: Primary | ICD-10-CM

## 2022-09-19 RX ORDER — AZITHROMYCIN 500 MG/1
500 TABLET, FILM COATED ORAL DAILY
Qty: 5 TABLET | Refills: 0 | Status: SHIPPED | OUTPATIENT
Start: 2022-09-19 | End: 2022-09-29

## 2022-11-14 ENCOUNTER — TELEPHONE (OUTPATIENT)
Dept: ENDOCRINOLOGY | Age: 32
End: 2022-11-14

## 2022-11-14 NOTE — TELEPHONE ENCOUNTER
11/14/2022  2:25 PM    Good Afternoon Dr. Tamara Jimenez,     Ms. Gomez called and stated she's not able to come in person or have VV for appts on Fridays in December due to work. She is aware of your next available appt in March. She stated she sees you every 6 months. Please Advise.      Thanks,   Lakeisha Deutsch

## 2022-11-14 NOTE — TELEPHONE ENCOUNTER
You can cancel her visit for December. Please have her just do her labs when she has a chance and I'll try to offer her a visit on a different day of the week in person or virtually in the 1-2 weeks after her labs are back.

## 2022-11-14 NOTE — TELEPHONE ENCOUNTER
11/14/2022  3:12 PM    Pt expressed understanding of the message per Dr. Rios Uriostegui.      Thanks,   Elham Siegel

## 2022-11-23 DIAGNOSIS — E05.00 GRAVES DISEASE: ICD-10-CM

## 2022-11-29 ENCOUNTER — OFFICE VISIT (OUTPATIENT)
Dept: FAMILY MEDICINE CLINIC | Age: 32
End: 2022-11-29
Payer: COMMERCIAL

## 2022-11-29 VITALS
HEIGHT: 60 IN | DIASTOLIC BLOOD PRESSURE: 91 MMHG | OXYGEN SATURATION: 99 % | BODY MASS INDEX: 22.3 KG/M2 | WEIGHT: 113.6 LBS | SYSTOLIC BLOOD PRESSURE: 127 MMHG | HEART RATE: 73 BPM

## 2022-11-29 DIAGNOSIS — J02.9 PHARYNGITIS, UNSPECIFIED ETIOLOGY: ICD-10-CM

## 2022-11-29 DIAGNOSIS — E05.00 GRAVES DISEASE: Primary | ICD-10-CM

## 2022-11-29 DIAGNOSIS — R06.09 OTHER FORM OF DYSPNEA: ICD-10-CM

## 2022-11-29 PROCEDURE — 99213 OFFICE O/P EST LOW 20 MIN: CPT | Performed by: FAMILY MEDICINE

## 2022-11-29 RX ORDER — PREDNISONE 10 MG/1
10 TABLET ORAL 2 TIMES DAILY
Qty: 10 TABLET | Refills: 0 | Status: SHIPPED | OUTPATIENT
Start: 2022-11-29

## 2022-11-29 RX ORDER — AZITHROMYCIN 250 MG/1
TABLET, FILM COATED ORAL
Qty: 6 TABLET | Refills: 0 | Status: SHIPPED | OUTPATIENT
Start: 2022-11-29

## 2022-11-29 NOTE — PROGRESS NOTES
HISTORY OF PRESENT ILLNESS  Janis Arvizu is a 33 y.o. female. 1day severe sore throat with dyspnea and anxiety. F/U Graves Disease,labs for Dr Rios Uriostegui. Follow-up  The history is provided by the Patient. This is a chronic problem. The problem occurs daily. The problem has not changed since onset. Pertinent negatives include no chest pain and no shortness of breath. Sore Throat   The history is provided by the Patient. This is a new problem. The current episode started 12 to 24 hours ago. The problem has been gradually worsening. There has been no fever. Associated symptoms include congestion, stridor and trouble swallowing. Pertinent negatives include no shortness of breath. Thyroid Problem  The history is provided by the Patient. This is a chronic problem. The problem occurs daily. Pertinent negatives include no chest pain and no shortness of breath. Nothing aggravates the symptoms. Review of Systems   Constitutional:  Positive for malaise/fatigue. Negative for fever. HENT:  Positive for congestion, sore throat and trouble swallowing. Respiratory:  Positive for stridor. Negative for shortness of breath. Cardiovascular:  Negative for chest pain. Skin:  Negative for rash. Physical Exam  Constitutional:       Appearance: Normal appearance. She is normal weight. HENT:      Head: Normocephalic and atraumatic. Right Ear: Tympanic membrane normal.      Left Ear: Tympanic membrane normal.      Nose: Congestion present. Mouth/Throat:      Mouth: Mucous membranes are moist.      Pharynx: Posterior oropharyngeal erythema present. No oropharyngeal exudate. Eyes:      Conjunctiva/sclera: Conjunctivae normal.      Pupils: Pupils are equal, round, and reactive to light. Cardiovascular:      Rate and Rhythm: Normal rate and regular rhythm. Pulses: Normal pulses. Heart sounds: Normal heart sounds. Skin:     General: Skin is warm and dry.    Neurological:      Mental Status: She is alert.     ASSESSMENT and PLAN  Diagnoses and all orders for this visit:    1. Graves disease  -     TSH 3RD GENERATION; Future  -     T4, FREE; Future  -     THYROID ANTIBODY PANEL; Future    2. Pharyngitis, unspecified etiology,sever with dyspnea and anxiety;recommend saltwater gargles,tylenol  -     azithromycin (ZITHROMAX) 250 mg tablet; Take 2 tablets today, then take 1 tablet daily  -     predniSONE (DELTASONE) 10 mg tablet; Take 10 mg by mouth two (2) times a day. 3. Other form of dyspnea  -     predniSONE (DELTASONE) 10 mg tablet; Take 10 mg by mouth two (2) times a day.

## 2022-11-29 NOTE — PROGRESS NOTES
Chief Complaint   Patient presents with    Follow-up     Not feeling well     1. \"Have you been to the ER, urgent care clinic since your last visit? Hospitalized since your last visit? \" No    2. \"Have you seen or consulted any other health care providers outside of the [address] [place] [address] since your last visit? \" No     3. For patients aged 39-70: Has the patient had a colonoscopy / FIT/ Cologuard? NA - based on age      If the patient is female:    4. For patients aged 41-77: Has the patient had a mammogram within the past 2 years? NA - based on age or sex      11. For patients aged 21-65: Has the patient had a pap smear?  No

## 2022-11-29 NOTE — LETTER
NOTIFICATION OF RETURN TO WORK / SCHOOL    11/29/2022 4:38 PM    Ms. Leighann Allred  [address] [address]  . To Whom It May Concern:    Leighann Allred was under the care of [facility] from 11/28/22. She will be able to return to work/school on 12/3/22 with no restrictions. If there are questions or concerns please have the patient contact our office.     Sincerely,      Haylie Winter MD

## 2022-11-30 LAB
T4 FREE SERPL-MCNC: 0.91 NG/DL (ref 0.82–1.77)
THYROGLOB AB SERPL-ACNC: 3.5 IU/ML (ref 0–0.9)
THYROPEROXIDASE AB SERPL-ACNC: 208 IU/ML (ref 0–34)
TSH SERPL DL<=0.005 MIU/L-ACNC: 6.26 UIU/ML (ref 0.45–4.5)

## 2023-01-03 ENCOUNTER — OFFICE VISIT (OUTPATIENT)
Dept: ENDOCRINOLOGY | Age: 33
End: 2023-01-03
Payer: COMMERCIAL

## 2023-01-03 VITALS
BODY MASS INDEX: 22.26 KG/M2 | HEIGHT: 60 IN | WEIGHT: 113.4 LBS | HEART RATE: 93 BPM | SYSTOLIC BLOOD PRESSURE: 127 MMHG | DIASTOLIC BLOOD PRESSURE: 84 MMHG

## 2023-01-03 DIAGNOSIS — E05.00 GRAVES DISEASE: Primary | ICD-10-CM

## 2023-01-03 PROCEDURE — 99214 OFFICE O/P EST MOD 30 MIN: CPT | Performed by: INTERNAL MEDICINE

## 2023-01-03 RX ORDER — LEVOTHYROXINE SODIUM 25 UG/1
25 TABLET ORAL
Qty: 30 TABLET | Refills: 11 | Status: SHIPPED | OUTPATIENT
Start: 2023-01-03

## 2023-01-03 NOTE — PROGRESS NOTES
Chief Complaint   Patient presents with    Thyroid Problem     PCP and pharmacy confirmed      History of Present Illness: Rachel Strong is a 33 y.o. female here for follow up of thyroid. Weight down 1 lbs since last visit in 6/22. She has been off the methimazole the past month. Does notice more fatigue and cold intolerance and also has had some constipation and dry skin and hair loss. She is agreeable to starting a low dose of levothyroxine. Her daughter will be 3 in April. She did start some vitamin D and B12 to help with energy as she is vegetarian. Current Outpatient Medications   Medication Sig    cholecalciferol, vitamin D3, (VITAMIN D3 PO) Take  by mouth daily. cyanocobalamin, vitamin B-12, (VITAMIN B-12 PO) Take  by mouth daily. No current facility-administered medications for this visit. Allergies   Allergen Reactions    Pcn [Penicillins] Unknown (comments)    Sulfa (Sulfonamide Antibiotics) Rash     A.s child     Review of Systems: PER HPI    Physical Examination:  Blood pressure 127/84, pulse 93, height 5' (1.524 m), weight 113 lb 6.4 oz (51.4 kg), not currently breastfeeding. General: pleasant, no distress, good eye contact   Neck: small goiter  Cardiovascular: regular, normal rate, nl s1 and s2, no m/r/g   Respiratory: clear to auscultation bilaterally   Integumentary: skin is normal, no edema  Neurological: reflexes 2+ at biceps, no tremors  Psychiatric: normal mood and affect    Data Reviewed:   Component      Latest Ref Rng & Units 12/29/2022 12/29/2022 12/29/2022 12/29/2022          12:21 PM 12:21 PM 12:21 PM 12:21 PM   Thyroid peroxidase Ab      0 - 34 IU/mL 195 (H)      Thyroglobulin Ab      0.0 - 0.9 IU/mL 4.0 (H)      T4, Free      0.82 - 1.77 ng/dL    1.00   TSH      0.450 - 4.500 uIU/mL   5.900 (H)    Thyrotropin Receptor Ab, serum      0.00 - 1.75 IU/L  <1.10         Assessment/Plan:     1.  Grave's disease: She has always been told she has normal thyroid function and delivered her son, Gearl Buerger, on 12/21/15 and after delivery developed hypertension and needed metoprolol. Had her thyroid levels checked after delivery and TSH was normal but saw Dr. Dilip Johnson on 5/18/16 and TSH was low at 0.012 and T4 was high at 13.3. Repeat labs with me in 5/16 showed TSH 0.009, FT4 3.27 and T3 263 and TRAb of 1.97 and TPO ab of 43 and I started her on MMI 30 mg daily. TSH up to 0.013 and FT4/T3 normal in 6/16 so decreased to 20 mg daily and TSH up to 8.93 in 7/16 so decreased to 10 mg daily and TSH up to 33 in 8/16 so decreased to 5 mg daily. TSH normal at 0.61 and FT4 1.34 and T3 151 in 10/16 so kept her dose the same. Then found out she was pregnant and TSH 3.47 in 11/16 and changed to PTU 50 mg 1 tab in am and 1/2 tab in pm during 1st trimester and then changed back to MMI 10 mg 1/2 tab daily when she was in 2nd trimester. TSH 4.73 and TRAb 2.75 and TPO ab 29 in 1/17 and decreased to 1/2 tab 4x/week,  TSH 2.32 in 3/17 and decreased to 1/2 tab 3x/week and TSH 1.76 and TRAb 0.83 in 4/17 and kept her dose the same through delivery in 6/17. TSH 3.76 and TRAb < 0.50 in 9/17 so decreased to 1/2 tab 2x/week for 3 months and TSH 3.35 in 12/17 so decreased to 1/2 tab once a week and TSH down to 0.439 and TRAb up to 1.99 in 3/18 so went back to 1/2 tab 3x/week. TSH 3.88 in 6/18 so decreased back to 1/2 of 10 mg tab 2x/week and TSH up to 5.8 in 9/18 so decreased to 1/2 tab once a week and TSH 2.93 and TRAb 0.83 in 1/19 so kept dose the same. TSH 3.86 and TRAb <0.50 in 3/19 so stopped the methimazole as her Grave's antibody test was undetectable and she was pregnant. She miscarried in 4/19 and then became pregnant again in 8/19 and her thyroid tests were normal during pregnancy and remained off medication. She delivered in 4/20 and her TSH was normal in 6/20 at 2.8.   She contacted me in 10/20 that she felt off and her TSH was 48 and TRAb was high at 13.5 so I started levothyroxine 88 mcg daily but she started developing hyperthyroid symptoms on this so I decreased to 75 mcg daily but still had hyperthyroid symptoms and stopped this on 11/22/20. Her TSH was low at 0.096 and FT4 normal at 1.33 on 11/25/20 so it's possible she developed post-partum thyroiditis and TSH normal at 2.13 and TRAb 14 on 12/31/20 so held on any medication. TSH down to 0.065 and TRAb 11.1 in 2/21 and began methimazole 10 mg daily. TSH 1.28 on 4/1/21 so decreased to 6 tabs/week. TSH up to 6.39 and TRAb 3.05 on 5/11/21 so decreased to 5 mg daily. TSH 6.07 and TRAb 1.66 in 6/21 so decreased to 2.5 mg daily. TSH 2.68 and TPO ab 87, TG ab 7.7 in 8/21 so decreased to 2.5 mg 5x/week and TSH 3.23 and TPO ab 88, TG ab 7.4 and TRAb < 1.1 in 9/21 so decreased to 2.5 mg 4x/week and TSH 4.66 in 10/21 so decreased to 2.5 mg 3x/week. TSH 2.52 and TPO ab 64, TG ab 3.6 and TRAb < 1.1 in 12/21 so decreased to 2.5 mg 2x/week. TSH 3.3 and TPO ab 58 and TG ab 3.3 in 1/22 so decreased to 2.5 mg once a week. TSH 3.29, TPO ab 68 and TG ab 3.5 in 3/22 so kept dose the same. TSH 2.61 in 4/22 so decreased to 2.5 mg every 10 days. TSH 2.9 and TPO ab 83 and TG ab 2.5 in 5/22 so kept dose the same. TSH 4.31, TPO ab 157, TG ab 2.6 and TRAb < 1.1 in 9/22 so decreased to 2.5 mg every 14 days. TSH up to 6.26, TPO ab 208 and TG ab 3.5 on 11/29/22 so stopped the methimazole. TSH 5.9, TPO ab 195, TG ab 4.0 and TRAb < 1.1 on 12/29/22 so restarted levothyroxine 25 mcg daily on 1/3/23. Previously explained that because she has had fluctuating hyper and hypothyroidism over the years, she may eventually benefit from thyroidectomy to eliminate the source of the antibody target that are causing her to flip between these 2 thyroid states and she wants to hold on this for now.   - begin levothyroxine 25 mcg daily  - check TSH and free T4 in 1 month and prior to next visit  - check thyroid ab panel prior to next visit            Patient Instructions   1) TSH is a thyroid test.  Your level is 5.9 which is still high and above goal of 0.45-2.0. This test goes opposite of your thyroid function and suggests you remain HYPOthyroid off the methimazole. 2) I will start a low dose of levothyroxine 25 mcg daily. This will be ready for  at the pharmacy today. 3) Take Levothyroxine either in the morning with just water, at least 30 minutes before breakfast and coffee and all other pills, or take it at bedtime on any empty stomach 2-3 hours after dinner. This must be  from vitamins, calcium, or iron by at least 4 hours. The vitamin D and B12 don't interfere. 4) Plan on repeating labs in 1 month and we'll see the effect of this dose. If you feel any return of hyperthyroid symptoms with starting this, then let me know and we can draw labs sooner. Follow-up and Dispositions    Return in about 6 months (around 7/3/2023).                Copy sent to:  Dr. Kendra Mays via [facility] care  Dr. Ximena Buchanan

## 2023-01-03 NOTE — PATIENT INSTRUCTIONS
1) TSH is a thyroid test.  Your level is 5.9 which is still high and above goal of 0.45-2.0. This test goes opposite of your thyroid function and suggests you remain HYPOthyroid off the methimazole. 2) I will start a low dose of levothyroxine 25 mcg daily. This will be ready for  at the pharmacy today. 3) Take Levothyroxine either in the morning with just water, at least 30 minutes before breakfast and coffee and all other pills, or take it at bedtime on any empty stomach 2-3 hours after dinner. This must be  from vitamins, calcium, or iron by at least 4 hours. The vitamin D and B12 don't interfere. 4) Plan on repeating labs in 1 month and we'll see the effect of this dose. If you feel any return of hyperthyroid symptoms with starting this, then let me know and we can draw labs sooner.

## 2023-02-03 DIAGNOSIS — E05.00 GRAVES DISEASE: ICD-10-CM

## 2023-03-13 LAB
ABO, EXTERNAL RESULT: NORMAL
C. TRACHOMATIS, EXTERNAL RESULT: NEGATIVE
HEP B, EXTERNAL RESULT: NEGATIVE
HIV, EXTERNAL RESULT: NEGATIVE
N. GONORRHOEAE, EXTERNAL RESULT: NEGATIVE
RPR, EXTERNAL RESULT: NONREACTIVE
RUBELLA TITER, EXTERNAL RESULT: NORMAL

## 2023-03-24 DIAGNOSIS — E05.00 GRAVES DISEASE: ICD-10-CM

## 2023-04-22 DIAGNOSIS — E05.00 GRAVES DISEASE: Primary | ICD-10-CM

## 2023-04-23 DIAGNOSIS — E05.00 GRAVES DISEASE: Primary | ICD-10-CM

## 2023-04-24 DIAGNOSIS — E05.00 GRAVES DISEASE: Primary | ICD-10-CM

## 2023-06-16 DIAGNOSIS — E05.00 GRAVES DISEASE: ICD-10-CM

## 2023-07-06 LAB
T4 FREE SERPL-MCNC: 0.94 NG/DL (ref 0.82–1.77)
TSH SERPL DL<=0.005 MIU/L-ACNC: 1.33 UIU/ML (ref 0.45–4.5)

## 2023-07-07 LAB
THYROGLOB AB SERPL-ACNC: <1 IU/ML (ref 0–0.9)
THYROPEROXIDASE AB SERPL-ACNC: 62 IU/ML (ref 0–34)

## 2023-07-10 ENCOUNTER — OFFICE VISIT (OUTPATIENT)
Age: 33
End: 2023-07-10
Payer: COMMERCIAL

## 2023-07-10 VITALS
HEIGHT: 60 IN | BODY MASS INDEX: 26.7 KG/M2 | WEIGHT: 136 LBS | SYSTOLIC BLOOD PRESSURE: 109 MMHG | DIASTOLIC BLOOD PRESSURE: 64 MMHG | HEART RATE: 76 BPM

## 2023-07-10 DIAGNOSIS — E05.00 GRAVES DISEASE: Primary | ICD-10-CM

## 2023-07-10 PROCEDURE — 99214 OFFICE O/P EST MOD 30 MIN: CPT | Performed by: INTERNAL MEDICINE

## 2023-07-10 RX ORDER — ASPIRIN 81 MG/1
81 TABLET ORAL DAILY
COMMUNITY

## 2023-07-10 NOTE — PROGRESS NOTES
and after delivery developed hypertension and needed metoprolol. Had her thyroid levels checked after delivery and TSH was normal but saw Dr. Daryel Bernheim on 5/18/16 and TSH was low at 0.012 and T4 was high at 13.3. Repeat labs with me in 5/16 showed TSH 0.009, FT4 3.27 and T3 263 and TRAb of 1.97 and TPO ab of 43 and I started her on MMI 30 mg daily. TSH up to 0.013 and FT4/T3 normal in 6/16 so decreased to 20 mg daily and TSH up to 8.93 in 7/16 so decreased to 10 mg daily and TSH up to 33 in 8/16 so decreased to 5 mg daily. TSH normal at 0.61 and FT4 1.34 and T3 151 in 10/16 so kept her dose the same. Then found out she was pregnant and TSH 3.47 in 11/16 and changed to PTU 50 mg 1 tab in am and 1/2 tab in pm during 1st trimester and then changed back to MMI 10 mg 1/2 tab daily when she was in 2nd trimester. TSH 4.73 and TRAb 2.75 and TPO ab 29 in 1/17 and decreased to 1/2 tab 4x/week,  TSH 2.32 in 3/17 and decreased to 1/2 tab 3x/week and TSH 1.76 and TRAb 0.83 in 4/17 and kept her dose the same through delivery in 6/17. TSH 3.76 and TRAb < 0.50 in 9/17 so decreased to 1/2 tab 2x/week for 3 months and TSH 3.35 in 12/17 so decreased to 1/2 tab once a week and TSH down to 0.439 and TRAb up to 1.99 in 3/18 so went back to 1/2 tab 3x/week. TSH 3.88 in 6/18 so decreased back to 1/2 of 10 mg tab 2x/week and TSH up to 5.8 in 9/18 so decreased to 1/2 tab once a week and TSH 2.93 and TRAb 0.83 in 1/19 so kept dose the same. TSH 3.86 and TRAb <0.50 in 3/19 so stopped the methimazole as her Grave's antibody test was undetectable and she was pregnant. She miscarried in 4/19 and then became pregnant again in 8/19 and her thyroid tests were normal during pregnancy and remained off medication. She delivered in 4/20 and her TSH was normal in 6/20 at 2.8.   She contacted me in 10/20 that she felt off and her TSH was 48 and TRAb was high at 13.5 so I started levothyroxine 88 mcg daily but she started developing hyperthyroid

## 2023-07-10 NOTE — PATIENT INSTRUCTIONS
1) TSH is a thyroid test.  Your level is 1.33 which is normal and at goal of 0.45-2.0. The TSH goes opposite of your thyroid dose and suggests your dose of levothyroxine is perfect so I will keep your dose the same. 2) Plan on repeating your labs in another 6-7 weeks and then I will send you a message through [facility] with your lab results. We will determine what dose to stay on until delivery.

## 2023-08-21 DIAGNOSIS — E05.00 GRAVES DISEASE: ICD-10-CM

## 2023-09-20 LAB
T4 FREE SERPL-MCNC: 0.91 NG/DL (ref 0.82–1.77)
TSH SERPL DL<=0.005 MIU/L-ACNC: 1.56 UIU/ML (ref 0.45–4.5)

## 2023-09-25 LAB — GBS, EXTERNAL RESULT: NEGATIVE

## 2023-10-15 ENCOUNTER — ANESTHESIA EVENT (OUTPATIENT)
Facility: HOSPITAL | Age: 33
End: 2023-10-15
Payer: COMMERCIAL

## 2023-10-15 ENCOUNTER — HOSPITAL ENCOUNTER (INPATIENT)
Facility: HOSPITAL | Age: 33
LOS: 3 days | Discharge: HOME | End: 2023-10-18
Attending: OBSTETRICS & GYNECOLOGY | Admitting: OBSTETRICS & GYNECOLOGY
Payer: COMMERCIAL

## 2023-10-15 ENCOUNTER — ANESTHESIA (OUTPATIENT)
Facility: HOSPITAL | Age: 33
End: 2023-10-15
Payer: COMMERCIAL

## 2023-10-15 PROBLEM — Z37.9 NORMAL LABOR: Status: ACTIVE | Noted: 2023-10-15

## 2023-10-15 PROBLEM — Z3A.38 38 WEEKS GESTATION OF PREGNANCY: Status: ACTIVE | Noted: 2023-10-15

## 2023-10-15 LAB
ALBUMIN SERPL-MCNC: 2.7 G/DL (ref 3.5–5)
ALBUMIN/GLOB SERPL: 0.8 (ref 1.1–2.2)
ALP SERPL-CCNC: 197 U/L (ref 45–117)
ALT SERPL-CCNC: 24 U/L (ref 12–78)
AMNISURE, POC: POSITIVE
ANION GAP SERPL CALC-SCNC: 5 MMOL/L (ref 5–15)
AST SERPL-CCNC: 15 U/L (ref 15–37)
BASOPHILS # BLD: 0 K/UL (ref 0–0.1)
BASOPHILS NFR BLD: 0 % (ref 0–1)
BILIRUB SERPL-MCNC: 0.4 MG/DL (ref 0.2–1)
BUN SERPL-MCNC: 5 MG/DL (ref 6–20)
BUN/CREAT SERPL: 10 (ref 12–20)
CALCIUM SERPL-MCNC: 8.7 MG/DL (ref 8.5–10.1)
CHLORIDE SERPL-SCNC: 111 MMOL/L (ref 97–108)
CO2 SERPL-SCNC: 22 MMOL/L (ref 21–32)
CREAT SERPL-MCNC: 0.5 MG/DL (ref 0.55–1.02)
DIFFERENTIAL METHOD BLD: ABNORMAL
EOSINOPHIL # BLD: 0.1 K/UL (ref 0–0.4)
EOSINOPHIL NFR BLD: 1 % (ref 0–7)
ERYTHROCYTE [DISTWIDTH] IN BLOOD BY AUTOMATED COUNT: 13.8 % (ref 11.5–14.5)
GLOBULIN SER CALC-MCNC: 3.3 G/DL (ref 2–4)
GLUCOSE SERPL-MCNC: 91 MG/DL (ref 65–100)
HCT VFR BLD AUTO: 35.6 % (ref 35–47)
HGB BLD-MCNC: 11.8 G/DL (ref 11.5–16)
IMM GRANULOCYTES # BLD AUTO: 0.1 K/UL (ref 0–0.04)
IMM GRANULOCYTES NFR BLD AUTO: 1 % (ref 0–0.5)
LYMPHOCYTES # BLD: 1.6 K/UL (ref 0.8–3.5)
LYMPHOCYTES NFR BLD: 19 % (ref 12–49)
Lab: NORMAL
MCH RBC QN AUTO: 28.2 PG (ref 26–34)
MCHC RBC AUTO-ENTMCNC: 33.1 G/DL (ref 30–36.5)
MCV RBC AUTO: 85 FL (ref 80–99)
MONOCYTES # BLD: 0.6 K/UL (ref 0–1)
MONOCYTES NFR BLD: 7 % (ref 5–13)
NEGATIVE QC PASS/FAIL: NORMAL
NEUTS SEG # BLD: 6.4 K/UL (ref 1.8–8)
NEUTS SEG NFR BLD: 72 % (ref 32–75)
NRBC # BLD: 0 K/UL (ref 0–0.01)
NRBC BLD-RTO: 0 PER 100 WBC
PLATELET # BLD AUTO: 192 K/UL (ref 150–400)
PMV BLD AUTO: 9.7 FL (ref 8.9–12.9)
POSITIVE QC PASS/FAIL: NORMAL
POTASSIUM SERPL-SCNC: 3.4 MMOL/L (ref 3.5–5.1)
PROT SERPL-MCNC: 6 G/DL (ref 6.4–8.2)
RBC # BLD AUTO: 4.19 M/UL (ref 3.8–5.2)
SODIUM SERPL-SCNC: 138 MMOL/L (ref 136–145)
WBC # BLD AUTO: 8.8 K/UL (ref 3.6–11)

## 2023-10-15 PROCEDURE — 86901 BLOOD TYPING SEROLOGIC RH(D): CPT

## 2023-10-15 PROCEDURE — 86850 RBC ANTIBODY SCREEN: CPT

## 2023-10-15 PROCEDURE — 1100000000 HC RM PRIVATE

## 2023-10-15 PROCEDURE — 99285 EMERGENCY DEPT VISIT HI MDM: CPT

## 2023-10-15 PROCEDURE — 86644 CMV ANTIBODY: CPT

## 2023-10-15 PROCEDURE — 86870 RBC ANTIBODY IDENTIFICATION: CPT

## 2023-10-15 PROCEDURE — 85025 COMPLETE CBC W/AUTO DIFF WBC: CPT

## 2023-10-15 PROCEDURE — 36415 COLL VENOUS BLD VENIPUNCTURE: CPT

## 2023-10-15 PROCEDURE — 80053 COMPREHEN METABOLIC PANEL: CPT

## 2023-10-15 PROCEDURE — 4500000002 HC ER NO CHARGE

## 2023-10-15 PROCEDURE — 86900 BLOOD TYPING SEROLOGIC ABO: CPT

## 2023-10-15 PROCEDURE — 86920 COMPATIBILITY TEST SPIN: CPT

## 2023-10-15 PROCEDURE — 4A1HXCZ MONITORING OF PRODUCTS OF CONCEPTION, CARDIAC RATE, EXTERNAL APPROACH: ICD-10-PCS | Performed by: STUDENT IN AN ORGANIZED HEALTH CARE EDUCATION/TRAINING PROGRAM

## 2023-10-15 RX ORDER — NALOXONE HYDROCHLORIDE 0.4 MG/ML
INJECTION, SOLUTION INTRAMUSCULAR; INTRAVENOUS; SUBCUTANEOUS PRN
Status: DISCONTINUED | OUTPATIENT
Start: 2023-10-15 | End: 2023-10-16

## 2023-10-15 RX ORDER — DIPHENHYDRAMINE HYDROCHLORIDE 50 MG/ML
25 INJECTION INTRAMUSCULAR; INTRAVENOUS EVERY 4 HOURS PRN
Status: DISCONTINUED | OUTPATIENT
Start: 2023-10-15 | End: 2023-10-16

## 2023-10-15 RX ORDER — EPHEDRINE SULFATE 50 MG/ML
10 INJECTION INTRAVENOUS
Status: DISCONTINUED | OUTPATIENT
Start: 2023-10-15 | End: 2023-10-16

## 2023-10-15 RX ORDER — SODIUM CHLORIDE 0.9 % (FLUSH) 0.9 %
5-40 SYRINGE (ML) INJECTION PRN
Status: DISCONTINUED | OUTPATIENT
Start: 2023-10-15 | End: 2023-10-16

## 2023-10-15 RX ORDER — MISOPROSTOL 200 UG/1
800 TABLET ORAL PRN
Status: DISCONTINUED | OUTPATIENT
Start: 2023-10-15 | End: 2023-10-16

## 2023-10-15 RX ORDER — SODIUM CHLORIDE 0.9 % (FLUSH) 0.9 %
5-40 SYRINGE (ML) INJECTION EVERY 12 HOURS SCHEDULED
Status: DISCONTINUED | OUTPATIENT
Start: 2023-10-15 | End: 2023-10-16

## 2023-10-15 RX ORDER — DIPHENHYDRAMINE HCL 25 MG
25 CAPSULE ORAL EVERY 4 HOURS PRN
Status: DISCONTINUED | OUTPATIENT
Start: 2023-10-15 | End: 2023-10-16

## 2023-10-15 RX ORDER — ONDANSETRON 2 MG/ML
4 INJECTION INTRAMUSCULAR; INTRAVENOUS EVERY 6 HOURS PRN
Status: DISCONTINUED | OUTPATIENT
Start: 2023-10-15 | End: 2023-10-16 | Stop reason: SDUPTHER

## 2023-10-15 RX ORDER — ONDANSETRON 2 MG/ML
4 INJECTION INTRAMUSCULAR; INTRAVENOUS EVERY 6 HOURS PRN
Status: DISCONTINUED | OUTPATIENT
Start: 2023-10-15 | End: 2023-10-16

## 2023-10-15 RX ORDER — SODIUM CHLORIDE, SODIUM LACTATE, POTASSIUM CHLORIDE, CALCIUM CHLORIDE 600; 310; 30; 20 MG/100ML; MG/100ML; MG/100ML; MG/100ML
INJECTION, SOLUTION INTRAVENOUS CONTINUOUS
Status: DISCONTINUED | OUTPATIENT
Start: 2023-10-15 | End: 2023-10-16

## 2023-10-15 RX ORDER — SODIUM CHLORIDE, SODIUM LACTATE, POTASSIUM CHLORIDE, AND CALCIUM CHLORIDE .6; .31; .03; .02 G/100ML; G/100ML; G/100ML; G/100ML
1000 INJECTION, SOLUTION INTRAVENOUS PRN
Status: DISCONTINUED | OUTPATIENT
Start: 2023-10-15 | End: 2023-10-16

## 2023-10-15 RX ORDER — FENTANYL 0.2 MG/100ML-BUPIV 0.125%-NACL 0.9% EPIDURAL INJ 2/0.125 MCG/ML-%
1-15 SOLUTION INJECTION CONTINUOUS
Status: DISCONTINUED | OUTPATIENT
Start: 2023-10-15 | End: 2023-10-16

## 2023-10-15 RX ORDER — SODIUM CHLORIDE 9 MG/ML
25 INJECTION, SOLUTION INTRAVENOUS PRN
Status: DISCONTINUED | OUTPATIENT
Start: 2023-10-15 | End: 2023-10-16

## 2023-10-15 RX ORDER — SODIUM CHLORIDE, SODIUM LACTATE, POTASSIUM CHLORIDE, AND CALCIUM CHLORIDE .6; .31; .03; .02 G/100ML; G/100ML; G/100ML; G/100ML
500 INJECTION, SOLUTION INTRAVENOUS PRN
Status: DISCONTINUED | OUTPATIENT
Start: 2023-10-15 | End: 2023-10-16

## 2023-10-15 RX ORDER — CARBOPROST TROMETHAMINE 250 UG/ML
250 INJECTION, SOLUTION INTRAMUSCULAR PRN
Status: DISCONTINUED | OUTPATIENT
Start: 2023-10-15 | End: 2023-10-16

## 2023-10-15 RX ORDER — ACETAMINOPHEN 325 MG/1
650 TABLET ORAL EVERY 4 HOURS PRN
Status: DISCONTINUED | OUTPATIENT
Start: 2023-10-15 | End: 2023-10-16

## 2023-10-15 RX ORDER — METHYLERGONOVINE MALEATE 0.2 MG/ML
200 INJECTION INTRAVENOUS PRN
Status: DISCONTINUED | OUTPATIENT
Start: 2023-10-15 | End: 2023-10-16

## 2023-10-15 RX ORDER — DOCUSATE SODIUM 100 MG/1
100 CAPSULE, LIQUID FILLED ORAL 2 TIMES DAILY
Status: DISCONTINUED | OUTPATIENT
Start: 2023-10-15 | End: 2023-10-16

## 2023-10-15 NOTE — PROGRESS NOTES
1930 Bedside and Verbal shift change report given to LOLY Nunes (oncoming nurse) by Marian Strong (offgoing nurse). Report included the following information Nurse Handoff Report, Intake/Output, MAR, and Recent Results. 2042 Dr. Mary Anne Cagle at bedside to assess pt and discuss plan of care. SVE 3/50/-3. AROM for large amount of clear fluid. Pt tolerated fairly well. Verbal orders received to continue position changes and pt to get and epidural once pt requests. Opportunity for questions was provided. Pt verbalized understanding and agrees to plan of care. 80 Pt requesting epidural. Fluid bolus started. 2336 Dr. Chapis Bright at bedside for epidural catheter placement. 2340 Pt visibly upset. Pt states she does not want an epidural at this time. Procedure stopped. 2341 RN at bedside. After asking pt what she would like to do or if pt needs assistance repositioning, pt states \"I'm fine sitting, bye.\" RN left the bedside. 8226 RN called to bedside. Pt wanted to discuss options for plan of care, verbalizes feeling like \"things have petered out, and not as intense. \" RN discussed options for ambulation or IV pitocin. Pt verbalizes desire to sleep. RN offers sleeping pill, pt declines. RN offers to turn off lights and allow pt to attempt to rest. Pt verbalizes understanding and agrees to plan of care. 9392 RN called to bedside to discuss plan of care. Pt reports difficulty sleeping and verbalizes \"things taking a long time. \" Option for IV pitocin discussed. Pt verbalizes understanding and agrees to plan of care. 5848 Pt requesting epidural at this time. IV pitocin paused. See Lesa Bright at bedside for epidural catheter placement. 8058 Epidural catheter placement complete. Pt tolerated fairly well. Pt turned on left side. 0404 Pt turned on right side. 0520 Pt turned on left side. 0525 Pt repositioned in exaggerated runners position in Trendelenburg.     2980 Pt turned on right side in

## 2023-10-15 NOTE — PROGRESS NOTES
1038: pt arrives from home with reports of leaking fluid since 0245. She woke up, went to the bathroom, and then noticed her shorts were wet when she got back in bed. She was not having any contractions so she was able to go back to sleep but noticed continued leaking and some mild contractions that started when she woke up. 1049: Dr. Key Rides at bedside   1054: ultrasound - [place] 20 cm  1100: set up for spec exam. Amnisure obtained by MD  1114: SVE by MD 1-2/50/edith   1118: amnisure positive. Plans for admission discussed.    1146: pt ambulated to L&D 7.   1200: care assumed by Esme Mojica RN

## 2023-10-15 NOTE — H&P
History & Physical    Name: Msuhtaq Aguilar MRN: [id number]  SSN: [id number]    YOB: 1990  Age: 29 y.o. Sex: female        Subjective:     Estimated Date of Delivery: 10/23/23  OB History          8    Para        Term   3            AB   4    Living   3         SAB        IAB        Ectopic        Molar        Multiple        Live Births                    Ms. Tucker Seip is admitted with pregnancy at 38w6d for  SROM in early labor . Prenatal course with Dr. Bk Rojas ([facility]) consistent with:  Hx of Graves Disease: Levothyroxine 25mg  2. Hx of PP Pre-eclampsia with her last pregnancy    Patient presents to [facility] with report of ROM at 0230 without significant CTXs. Patient notes continued slow LOF and small amounts of VB. CTXs present in [facility], but not painful. Sono: Variable lie patient moved from transverse to vertex while scanning. [place] 20.2  Amnisure positive    Please see prenatal records for details.     Past Medical History:   Diagnosis Date    Abnormal Papanicolaou smear of cervix     colpo normal    Anemia     Asthma     Miscarriage     PCOS (polycystic ovarian syndrome)     Postpartum hypertension 2015    also 2017    Psychiatric problem     depression/ anxiety    Thyroid activity decreased      Past Surgical History:   Procedure Laterality Date    GYN      ectopic pregnancy    HEENT      wisdom teeth extraction    OTHER SURGICAL HISTORY       Social History     Occupational History    Not on file   Tobacco Use    Smoking status: Never    Smokeless tobacco: Never   Substance and Sexual Activity    Alcohol use: No    Drug use: No    Sexual activity: Not on file     Family History   Problem Relation Age of Onset    Diabetes Father     Thyroid Disease Maternal Grandmother         hypothyroidism    Heart Disease Father        Allergies   Allergen Reactions    Penicillins      Other reaction(s): Unknown (comments)    Sulfa Antibiotics Rash     A.s child     Prior to Admission

## 2023-10-15 NOTE — PROGRESS NOTES
1200- SBAR bedside report given by JULY Boykin RN    1401- Dr Josias Flowers at bedside, patient unable to handle check. US verified vertex. Abdominal binder applied    1400-patient encourage to move around the room. Lunging on both sides, swaying at side of bed    1520- R side lying flying cowgirl    1532- L side lying flying cowgirl     1550- patient in evelyne position     1630- patient up moving around    1930-Bedside and Verbal shift change report given to Musa Anderson RN (oncoming nurse) by Mathew Brady RN (offgoing nurse). Report included the following information Nurse Handoff Report, Intake/Output, MAR, and Recent Results.

## 2023-10-16 PROCEDURE — 6360000002 HC RX W HCPCS: Performed by: OBSTETRICS & GYNECOLOGY

## 2023-10-16 PROCEDURE — 7210000100 HC LABOR FEE PER 1 HR

## 2023-10-16 PROCEDURE — 6370000000 HC RX 637 (ALT 250 FOR IP): Performed by: OBSTETRICS & GYNECOLOGY

## 2023-10-16 PROCEDURE — 2500000003 HC RX 250 WO HCPCS: Performed by: ANESTHESIOLOGY

## 2023-10-16 PROCEDURE — 7100000001 HC PACU RECOVERY - ADDTL 15 MIN

## 2023-10-16 PROCEDURE — 6360000002 HC RX W HCPCS: Performed by: ANESTHESIOLOGY

## 2023-10-16 PROCEDURE — 1120000000 HC RM PRIVATE OB

## 2023-10-16 PROCEDURE — 7220000101 HC DELIVERY VAGINAL/SINGLE

## 2023-10-16 PROCEDURE — 2580000003 HC RX 258: Performed by: OBSTETRICS & GYNECOLOGY

## 2023-10-16 PROCEDURE — 3700000156 HC EPIDURAL ANESTHESIA

## 2023-10-16 PROCEDURE — 7100000000 HC PACU RECOVERY - FIRST 15 MIN

## 2023-10-16 PROCEDURE — 3700000025 EPIDURAL BLOCK: Performed by: ANESTHESIOLOGY

## 2023-10-16 RX ORDER — SODIUM CHLORIDE 9 MG/ML
INJECTION, SOLUTION INTRAVENOUS PRN
Status: DISCONTINUED | OUTPATIENT
Start: 2023-10-16 | End: 2023-10-18 | Stop reason: HOSPADM

## 2023-10-16 RX ORDER — BUPIVACAINE HYDROCHLORIDE 2.5 MG/ML
INJECTION, SOLUTION EPIDURAL; INFILTRATION; INTRACAUDAL PRN
Status: DISCONTINUED | OUTPATIENT
Start: 2023-10-16 | End: 2023-10-16 | Stop reason: SDUPTHER

## 2023-10-16 RX ORDER — MISOPROSTOL 200 UG/1
800 TABLET ORAL PRN
Status: DISCONTINUED | OUTPATIENT
Start: 2023-10-16 | End: 2023-10-18 | Stop reason: HOSPADM

## 2023-10-16 RX ORDER — BUPIVACAINE HYDROCHLORIDE 2.5 MG/ML
INJECTION, SOLUTION EPIDURAL; INFILTRATION; INTRACAUDAL
Status: COMPLETED
Start: 2023-10-16 | End: 2023-10-16

## 2023-10-16 RX ORDER — LEVOTHYROXINE SODIUM 0.05 MG/1
50 TABLET ORAL
Status: DISCONTINUED | OUTPATIENT
Start: 2023-10-17 | End: 2023-10-18 | Stop reason: HOSPADM

## 2023-10-16 RX ORDER — LIDOCAINE HCL/EPINEPHRINE/PF 2%-1:200K
VIAL (ML) INJECTION PRN
Status: DISCONTINUED | OUTPATIENT
Start: 2023-10-16 | End: 2023-10-16 | Stop reason: SDUPTHER

## 2023-10-16 RX ORDER — SODIUM CHLORIDE 0.9 % (FLUSH) 0.9 %
5-40 SYRINGE (ML) INJECTION EVERY 12 HOURS SCHEDULED
Status: DISCONTINUED | OUTPATIENT
Start: 2023-10-16 | End: 2023-10-18 | Stop reason: HOSPADM

## 2023-10-16 RX ORDER — MODIFIED LANOLIN
OINTMENT (GRAM) TOPICAL PRN
Status: DISCONTINUED | OUTPATIENT
Start: 2023-10-16 | End: 2023-10-18 | Stop reason: HOSPADM

## 2023-10-16 RX ORDER — DOCUSATE SODIUM 100 MG/1
100 CAPSULE, LIQUID FILLED ORAL 2 TIMES DAILY
Status: DISCONTINUED | OUTPATIENT
Start: 2023-10-16 | End: 2023-10-18 | Stop reason: HOSPADM

## 2023-10-16 RX ORDER — IBUPROFEN 400 MG/1
800 TABLET ORAL EVERY 8 HOURS SCHEDULED
Status: DISCONTINUED | OUTPATIENT
Start: 2023-10-16 | End: 2023-10-18 | Stop reason: HOSPADM

## 2023-10-16 RX ORDER — SODIUM CHLORIDE, SODIUM LACTATE, POTASSIUM CHLORIDE, CALCIUM CHLORIDE 600; 310; 30; 20 MG/100ML; MG/100ML; MG/100ML; MG/100ML
INJECTION, SOLUTION INTRAVENOUS CONTINUOUS
Status: DISCONTINUED | OUTPATIENT
Start: 2023-10-16 | End: 2023-10-18 | Stop reason: HOSPADM

## 2023-10-16 RX ORDER — ACETAMINOPHEN 500 MG
1000 TABLET ORAL EVERY 8 HOURS SCHEDULED
Status: DISCONTINUED | OUTPATIENT
Start: 2023-10-16 | End: 2023-10-18 | Stop reason: HOSPADM

## 2023-10-16 RX ORDER — IBUPROFEN 400 MG/1
800 TABLET ORAL EVERY 8 HOURS PRN
Status: DISCONTINUED | OUTPATIENT
Start: 2023-10-16 | End: 2023-10-18 | Stop reason: HOSPADM

## 2023-10-16 RX ORDER — SODIUM CHLORIDE 0.9 % (FLUSH) 0.9 %
5-40 SYRINGE (ML) INJECTION PRN
Status: DISCONTINUED | OUTPATIENT
Start: 2023-10-16 | End: 2023-10-18 | Stop reason: HOSPADM

## 2023-10-16 RX ORDER — ONDANSETRON 4 MG/1
8 TABLET, ORALLY DISINTEGRATING ORAL EVERY 8 HOURS PRN
Status: DISCONTINUED | OUTPATIENT
Start: 2023-10-16 | End: 2023-10-18 | Stop reason: HOSPADM

## 2023-10-16 RX ADMIN — LIDOCAINE HYDROCHLORIDE AND EPINEPHRINE 5 ML: 20; 5 INJECTION, SOLUTION EPIDURAL; INFILTRATION; INTRACAUDAL; PERINEURAL at 03:21

## 2023-10-16 RX ADMIN — Medication 10 ML/HR: at 04:15

## 2023-10-16 RX ADMIN — IBUPROFEN 800 MG: 400 TABLET, FILM COATED ORAL at 10:19

## 2023-10-16 RX ADMIN — OXYTOCIN 2 MILLI-UNITS/MIN: 10 INJECTION INTRAVENOUS at 02:30

## 2023-10-16 RX ADMIN — BUPIVACAINE HYDROCHLORIDE 2 ML: 2.5 INJECTION, SOLUTION EPIDURAL; INFILTRATION; INTRACAUDAL; PERINEURAL at 03:44

## 2023-10-16 NOTE — ANESTHESIA PROCEDURE NOTES
Epidural Block    Patient location during procedure: OB  Start time: 10/16/2023 3:17 AM  End time: 10/16/2023 3:27 AM  Reason for block: labor epidural  Staffing  Anesthesiologist: Colonel Meliton DO  Performed by: Colonel Meliton DO  Authorized by: Colonel Meliton DO    Epidural  Patient position: sitting  Prep: DuraPrep  Patient monitoring: continuous pulse ox and frequent blood pressure checks  Approach: midline  Location: L3-4  Injection technique: JAIME air  Provider prep: mask  Needle  Needle type: Tuohy   Needle gauge: 17 G  Needle length: 3.5 in  Needle insertion depth: 6 cm  Catheter type: end hole  Catheter size: 18 G  Catheter at skin depth: 10 cm  Test dose: negativeCatheter Secured: tegaderm and tape  Assessment  Sensory level: T10  Hemodynamics: stable  Attempts: 1  Outcomes: uncomplicated and patient tolerated procedure well  Preanesthetic Checklist  Completed: patient identified, IV checked, site marked, risks and benefits discussed, surgical/procedural consents, equipment checked, pre-op evaluation, timeout performed, anesthesia consent given, oxygen available, monitors applied/VS acknowledged, fire risk safety assessment completed and verbalized and blood product R/B/A discussed and consented

## 2023-10-16 NOTE — ANESTHESIA POSTPROCEDURE EVALUATION
Department of Anesthesiology  Postprocedure Note    Patient: Lisandro Jacobs  MRN: [id number]  YOB: 1990  Date of evaluation: 10/16/2023      Procedure Summary     Date: 10/16/23 Room / Location:     Anesthesia Start: 5720 Anesthesia Stop: 0900    Procedure: Labor Analgesia Diagnosis:     Scheduled Providers:  Responsible Provider: Fabian Banuelos MD    Anesthesia Type: epidural ASA Status: 2          Anesthesia Type: No value filed.     Nando Phase I:      Nando Phase II:        Anesthesia Post Evaluation    Patient location during evaluation: PACU  Patient participation: complete - patient participated  Level of consciousness: awake  Pain score: 0  Airway patency: patent  Nausea & Vomiting: no nausea  Complications: no  Cardiovascular status: blood pressure returned to baseline and hemodynamically stable  Respiratory status: acceptable  Hydration status: stable  Pain management: adequate and satisfactory to patient

## 2023-10-16 NOTE — DISCHARGE INSTRUCTIONS
want to avoid heavy, greasy foods and other foods that could increase constipation. Wound care  If you have stitches, continue to rinse with a squirt bottle of warm water each time you use the bathroom for about 2 weeks. Your stitches will gradually dissolve over several weeks. Sitz baths are also helpful to keep the wound clean, encourage healing, and to help with pain associated with the stitches or hemorrhoids. You can use either a sitz bath basin or a bathtub filled with 2-3\" inches of plain warm water. Soak for 10 minutes 3 times a day. Pain Management  If you were not given a prescription pain medication, you can take over the counter pain medicines like Tylenol (acetominophen), Advil or Motrin (ibuprofen). You can take acetominophen and ibuprofen together, alternating doses every few hours. You will get the most relief if you take the maximum dose:  Tylenol or acetominophen 1000 mg every 6 hours (equivalent to 2 Extra Strength Tylenols every 6 hours)  Motrin or Advil (generic ibuprofen) 800 mg every 8 hours (4 tablets or capsules every 8 hours)  If you were given a prescription pain medication, you can take ibuprofen along with it (doses as above), but not Tylenol. Use ibuprofen as the main medication, and take the prescription medication if needed for more severe pain not relieved by the ibuprofen. Your goal should be to take only the minimum necessary amounts of the prescription medication (narcotic), as these pain medicines can be habit-forming and will worsen or cause constipation. Most patients will find that within a couple of days, their pain is adequately controlled using only over-the-counter medications. A heating pad can also be very helpful for cramping or back pain. Over-the-counter hemorrhoid wipes and ointments are fine to use if you have hemorrhoids. Constipation  You may find that bowel movements are irregular after delivery and that you have a tendency to be constipated.

## 2023-10-16 NOTE — PROGRESS NOTES
0730 Bedside shift change report given to LIBORIO Cunningham RN (oncoming nurse) by Equidate Darin RN (offgoing nurse). Report included the following information Nurse Handoff Report, Index, Intake/Output, MAR, and Recent Results.      0900  of baby boy

## 2023-10-16 NOTE — LACTATION NOTE
This note was copied from a baby's chart. Infant born vaginally this morning to a  mom at 44 weeks gestation. Mom nursed her other children for a short time. This infant has latched since birth and mom has lots of easily expressed colostrum. Infant is LGA with stable blood sugars. At the time of my visit, infant had finished nursing on the first breast. He was too sleepy to latch to second side. Demonstrated hand expression and provided infant with 8 large drops of colostrum via spoon. Feeding Plan: Mother will keep baby skin to skin as often as possible, feed on demand, 8-12x/day , respond to feeding cues, obtain latch, listen for audible swallowing, be aware of signs of oxytocin release/ cramping,thirst,sleepiness while breastfeeding, offer both breasts,and will not limit feedings. Mother agrees to utilize breast massage while nursing to facilitate lactogenesis.

## 2023-10-16 NOTE — L&D DELIVERY NOTE
Patient: Mushtaq Aguilar  MRN: [id number]  : 1990    Delivery Note    Pre-Delivery Diagnosis: IUP at 39w0d, SROM    Delivery:  Spontaneous vaginal delivery  Called to patient's room when she was found to be complete, +2 station. She pushed for several contractions, then the patient delivered the fetal head in LOP position. The anterior shoulder followed with ease, and viable male infant was placed directly on the patient's chest. After >1 minute, the cord was doubly clamped, then cut by FOB. Cord blood was collected. The placenta then delivered spontaneously, with gentle traction on the umbilical cord. Bimanual massage was performed, and the fundus was noted to be firm. The cervix and vagina were examined, and no lacerations were noted. The placenta was inspected on the back table, noted to be intact, and discarded. Sponge and needle counts were correct. The patient and infant remained stable in the delivery room. Infant Details:  Information for the patient's :  Martin Kowalski [[id number]]      Weight 3960g. APGARs 9 & 9. Placenta: Spontaneous/Intact with 3-vessel cord    Episiotomy: None    Laceration(s): None    Episiotomy/Laceration(s) Repair: Not necessary.      QBL: 204 mL    Anesthesia: Epidural    Complications: None    Group Beta Strep: Negative    Signed: Rickey Villar MD     2023      Martin Meghana [[id number]]      Labor Events     Labor: No   Steroids: None  Cervical Ripening Date/Time:      Antibiotics Received during Labor: No  Rupture Date/Time:  10/15/23 02:45:00   Rupture Type: SROM, AROM  Fluid Color: Clear  Fluid Odor: None  Fluid Volume: Large  Induction: None  Augmentation: Oxytocin              Anesthesia    Method: Epidural       Labor Event Times      Labor onset date/time:        Dilation complete date/time:  10/16/23 08:43:00 EDT     Start pushing date/time:     Decision date/time (emergent ):            Labor Length    2nd

## 2023-10-17 PROCEDURE — 6370000000 HC RX 637 (ALT 250 FOR IP): Performed by: STUDENT IN AN ORGANIZED HEALTH CARE EDUCATION/TRAINING PROGRAM

## 2023-10-17 PROCEDURE — 6360000002 HC RX W HCPCS: Performed by: OBSTETRICS & GYNECOLOGY

## 2023-10-17 PROCEDURE — 36415 COLL VENOUS BLD VENIPUNCTURE: CPT

## 2023-10-17 PROCEDURE — 86901 BLOOD TYPING SEROLOGIC RH(D): CPT

## 2023-10-17 PROCEDURE — 96372 THER/PROPH/DIAG INJ SC/IM: CPT

## 2023-10-17 PROCEDURE — 1120000000 HC RM PRIVATE OB

## 2023-10-17 PROCEDURE — 86900 BLOOD TYPING SEROLOGIC ABO: CPT

## 2023-10-17 PROCEDURE — 85461 HEMOGLOBIN FETAL: CPT

## 2023-10-17 RX ADMIN — HUMAN RHO(D) IMMUNE GLOBULIN 300 MCG: 300 INJECTION, SOLUTION INTRAMUSCULAR at 21:34

## 2023-10-17 RX ADMIN — IBUPROFEN 800 MG: 400 TABLET, FILM COATED ORAL at 16:20

## 2023-10-17 RX ADMIN — IBUPROFEN 800 MG: 400 TABLET, FILM COATED ORAL at 07:58

## 2023-10-17 RX ADMIN — LEVOTHYROXINE SODIUM 50 MCG: 0.05 TABLET ORAL at 07:58

## 2023-10-17 NOTE — LACTATION NOTE
This note was copied from a baby's chart. Mom has decided to switch to formula \"for now\" as latching is very uncomfortable to her. She hope to return to breastfeeding later. She states she had the same experience with her other babies. Educated mom on the importance of breast stimulation to ensure adequate milk production and that she should pump if she is not putting infant to the breast. Mom declines the use of a breast pump as she want to give her breasts a rest. Suggested mom call for assistance as needed with breastfeeding.

## 2023-10-18 VITALS
BODY MASS INDEX: 30.23 KG/M2 | OXYGEN SATURATION: 100 % | WEIGHT: 154 LBS | SYSTOLIC BLOOD PRESSURE: 135 MMHG | HEIGHT: 60 IN | DIASTOLIC BLOOD PRESSURE: 85 MMHG | RESPIRATION RATE: 16 BRPM | HEART RATE: 71 BPM | TEMPERATURE: 97.7 F

## 2023-10-18 LAB
ABO + RH BLD: NORMAL
ABO + RH BLD: NORMAL
BLD PROD TYP BPU: NORMAL
BLOOD BANK DISPENSE STATUS: NORMAL
BLOOD GROUP ANTIBODIES SERPL: NORMAL
BLOOD GROUP ANTIBODIES SERPL: NORMAL
BPU ID: NORMAL
CROSSMATCH RESULT: NORMAL
CROSSMATCH RESULT: NORMAL
FETAL SCREEN: NORMAL
SPECIMEN EXP DATE BLD: NORMAL
UNIT DIVISION: 0

## 2023-10-18 PROCEDURE — 6370000000 HC RX 637 (ALT 250 FOR IP): Performed by: STUDENT IN AN ORGANIZED HEALTH CARE EDUCATION/TRAINING PROGRAM

## 2023-10-18 PROCEDURE — 6370000000 HC RX 637 (ALT 250 FOR IP): Performed by: OBSTETRICS & GYNECOLOGY

## 2023-10-18 RX ORDER — IBUPROFEN 800 MG/1
800 TABLET ORAL EVERY 8 HOURS PRN
Qty: 30 TABLET | Refills: 0 | Status: SHIPPED | OUTPATIENT
Start: 2023-10-18

## 2023-10-18 RX ADMIN — IBUPROFEN 800 MG: 400 TABLET, FILM COATED ORAL at 00:47

## 2023-10-18 RX ADMIN — IBUPROFEN 800 MG: 400 TABLET, FILM COATED ORAL at 08:58

## 2023-10-18 ASSESSMENT — PAIN DESCRIPTION - LOCATION
LOCATION: ABDOMEN
LOCATION: ABDOMEN

## 2023-10-18 ASSESSMENT — PAIN DESCRIPTION - DESCRIPTORS
DESCRIPTORS: CRAMPING
DESCRIPTORS: CRAMPING

## 2023-10-18 ASSESSMENT — PAIN - FUNCTIONAL ASSESSMENT
PAIN_FUNCTIONAL_ASSESSMENT: ACTIVITIES ARE NOT PREVENTED
PAIN_FUNCTIONAL_ASSESSMENT: ACTIVITIES ARE NOT PREVENTED

## 2023-10-18 ASSESSMENT — PAIN SCALES - GENERAL
PAINLEVEL_OUTOF10: 4
PAINLEVEL_OUTOF10: 3

## 2023-10-18 ASSESSMENT — PAIN DESCRIPTION - ORIENTATION
ORIENTATION: LOWER
ORIENTATION: LOWER

## 2023-10-18 NOTE — DISCHARGE SUMMARY
Patient ID:  Esperanza Riley  [id number]  3 y.o.  1990    Admit Date: 10/15/2023    Discharge Date: 10/18/2023     Admitting Physician: Adri Albrecht MD  Attending Physician: Carie Moore DO    Admission Diagnoses: 38 weeks gestation of pregnancy [Z3A.38]    Discharge Diagnoses: Same as above with  producing a viable infant. Information for the patient's :  Cindy Watters [[id number]]           Additional Diagnoses:  SROM . Maternal Labs: No components found for: \"OBEXTABORH\", \"OBEXTABSCRN\", \"OBEXTHBSAG\", \"OBEXTHIV\", \"OBEXTRUBELLA\", \"OBEXTRPR\", \"OBEXTGRBS\"    Cord Blood Results:   Information for the patient's :  Cindy Watters [[id number]]     Lab Results   Component Value Date/Time    ABORH A POSITIVE 10/16/2023 09:18 AM    BILI IF DIRECT KRYSTAL POSITIVE, BILIRUBIN TO FOLLOW 10/16/2023 09:18 AM            History of Present Illness:   OB History          8    Para   1    Term   4            AB   4    Living   4         SAB        IAB        Ectopic        Molar        Multiple   0    Live Births   1              Admitted for Presumptive ROM . Hospital Course:   Patient was admitted as above and delivered via  . Please the chart for details. The postpartum course was unremarkable. She was deemed stable for discharge home on day 2. Follow-up:  She was instructed to follow-up with her obstetrician in 2 weeks.     Results of Postpartum Depression Screening:  EPDS             Signed:  Carie Moore DO  10/18/2023  6:59 AM

## 2023-10-21 ENCOUNTER — HOSPITAL ENCOUNTER (OUTPATIENT)
Facility: HOSPITAL | Age: 33
Setting detail: OBSERVATION
Discharge: HOME | End: 2023-10-22
Attending: OBSTETRICS & GYNECOLOGY | Admitting: OBSTETRICS & GYNECOLOGY
Payer: COMMERCIAL

## 2023-10-21 LAB
ALBUMIN SERPL-MCNC: 2.8 G/DL (ref 3.5–5)
ALBUMIN/GLOB SERPL: 0.8 (ref 1.1–2.2)
ALP SERPL-CCNC: 134 U/L (ref 45–117)
ALT SERPL-CCNC: 43 U/L (ref 12–78)
ANION GAP SERPL CALC-SCNC: 7 MMOL/L (ref 5–15)
AST SERPL-CCNC: 38 U/L (ref 15–37)
BILIRUB SERPL-MCNC: 0.3 MG/DL (ref 0.2–1)
BUN SERPL-MCNC: 10 MG/DL (ref 6–20)
BUN/CREAT SERPL: 15 (ref 12–20)
CALCIUM SERPL-MCNC: 8.8 MG/DL (ref 8.5–10.1)
CHLORIDE SERPL-SCNC: 111 MMOL/L (ref 97–108)
CO2 SERPL-SCNC: 25 MMOL/L (ref 21–32)
CREAT SERPL-MCNC: 0.68 MG/DL (ref 0.55–1.02)
ERYTHROCYTE [DISTWIDTH] IN BLOOD BY AUTOMATED COUNT: 13.2 % (ref 11.5–14.5)
GLOBULIN SER CALC-MCNC: 3.6 G/DL (ref 2–4)
GLUCOSE SERPL-MCNC: 88 MG/DL (ref 65–100)
HCT VFR BLD AUTO: 34.8 % (ref 35–47)
HGB BLD-MCNC: 11.5 G/DL (ref 11.5–16)
MCH RBC QN AUTO: 28.2 PG (ref 26–34)
MCHC RBC AUTO-ENTMCNC: 33 G/DL (ref 30–36.5)
MCV RBC AUTO: 85.3 FL (ref 80–99)
NRBC # BLD: 0 K/UL (ref 0–0.01)
NRBC BLD-RTO: 0 PER 100 WBC
PLATELET # BLD AUTO: 270 K/UL (ref 150–400)
PMV BLD AUTO: 9.4 FL (ref 8.9–12.9)
POTASSIUM SERPL-SCNC: 3.8 MMOL/L (ref 3.5–5.1)
PROT SERPL-MCNC: 6.4 G/DL (ref 6.4–8.2)
RBC # BLD AUTO: 4.08 M/UL (ref 3.8–5.2)
SODIUM SERPL-SCNC: 143 MMOL/L (ref 136–145)
WBC # BLD AUTO: 11.9 K/UL (ref 3.6–11)

## 2023-10-21 PROCEDURE — 96374 THER/PROPH/DIAG INJ IV PUSH: CPT

## 2023-10-21 PROCEDURE — G0378 HOSPITAL OBSERVATION PER HR: HCPCS

## 2023-10-21 PROCEDURE — 96376 TX/PRO/DX INJ SAME DRUG ADON: CPT

## 2023-10-21 PROCEDURE — 6370000000 HC RX 637 (ALT 250 FOR IP): Performed by: OBSTETRICS & GYNECOLOGY

## 2023-10-21 PROCEDURE — 4500000002 HC ER NO CHARGE

## 2023-10-21 PROCEDURE — 6360000002 HC RX W HCPCS: Performed by: OBSTETRICS & GYNECOLOGY

## 2023-10-21 PROCEDURE — 36415 COLL VENOUS BLD VENIPUNCTURE: CPT

## 2023-10-21 PROCEDURE — 2580000003 HC RX 258: Performed by: OBSTETRICS & GYNECOLOGY

## 2023-10-21 PROCEDURE — 85027 COMPLETE CBC AUTOMATED: CPT

## 2023-10-21 PROCEDURE — 99284 EMERGENCY DEPT VISIT MOD MDM: CPT

## 2023-10-21 PROCEDURE — 80053 COMPREHEN METABOLIC PANEL: CPT

## 2023-10-21 RX ORDER — HYDRALAZINE HYDROCHLORIDE 20 MG/ML
10 INJECTION INTRAMUSCULAR; INTRAVENOUS
Status: COMPLETED | OUTPATIENT
Start: 2023-10-21 | End: 2023-10-21

## 2023-10-21 RX ORDER — LABETALOL HYDROCHLORIDE 5 MG/ML
40 INJECTION, SOLUTION INTRAVENOUS
Status: DISPENSED | OUTPATIENT
Start: 2023-10-21 | End: 2023-10-22

## 2023-10-21 RX ORDER — BUTALBITAL, ACETAMINOPHEN AND CAFFEINE 50; 325; 40 MG/1; MG/1; MG/1
1 TABLET ORAL EVERY 4 HOURS PRN
Status: DISCONTINUED | OUTPATIENT
Start: 2023-10-21 | End: 2023-10-22 | Stop reason: HOSPADM

## 2023-10-21 RX ORDER — SODIUM CHLORIDE 0.9 % (FLUSH) 0.9 %
5-40 SYRINGE (ML) INJECTION PRN
Status: DISCONTINUED | OUTPATIENT
Start: 2023-10-21 | End: 2023-10-22 | Stop reason: HOSPADM

## 2023-10-21 RX ORDER — SODIUM CHLORIDE, SODIUM LACTATE, POTASSIUM CHLORIDE, CALCIUM CHLORIDE 600; 310; 30; 20 MG/100ML; MG/100ML; MG/100ML; MG/100ML
INJECTION, SOLUTION INTRAVENOUS CONTINUOUS
Status: DISCONTINUED | OUTPATIENT
Start: 2023-10-21 | End: 2023-10-22 | Stop reason: HOSPADM

## 2023-10-21 RX ORDER — ACETAMINOPHEN 500 MG
1000 TABLET ORAL EVERY 8 HOURS SCHEDULED
Status: DISCONTINUED | OUTPATIENT
Start: 2023-10-21 | End: 2023-10-22 | Stop reason: HOSPADM

## 2023-10-21 RX ORDER — SODIUM CHLORIDE 9 MG/ML
INJECTION, SOLUTION INTRAVENOUS PRN
Status: DISCONTINUED | OUTPATIENT
Start: 2023-10-21 | End: 2023-10-22 | Stop reason: HOSPADM

## 2023-10-21 RX ORDER — LABETALOL HYDROCHLORIDE 5 MG/ML
20 INJECTION, SOLUTION INTRAVENOUS
Status: DISPENSED | OUTPATIENT
Start: 2023-10-21 | End: 2023-10-22

## 2023-10-21 RX ORDER — SODIUM CHLORIDE 0.9 % (FLUSH) 0.9 %
5-40 SYRINGE (ML) INJECTION EVERY 12 HOURS SCHEDULED
Status: DISCONTINUED | OUTPATIENT
Start: 2023-10-21 | End: 2023-10-22 | Stop reason: HOSPADM

## 2023-10-21 RX ORDER — HYDRALAZINE HYDROCHLORIDE 20 MG/ML
5 INJECTION INTRAMUSCULAR; INTRAVENOUS
Status: COMPLETED | OUTPATIENT
Start: 2023-10-21 | End: 2023-10-21

## 2023-10-21 RX ORDER — BUTALBITAL, ACETAMINOPHEN AND CAFFEINE 50; 325; 40 MG/1; MG/1; MG/1
1 TABLET ORAL EVERY 4 HOURS PRN
Status: DISCONTINUED | OUTPATIENT
Start: 2023-10-21 | End: 2023-10-21

## 2023-10-21 RX ORDER — NIFEDIPINE 30 MG/1
30 TABLET, EXTENDED RELEASE ORAL DAILY
Status: DISCONTINUED | OUTPATIENT
Start: 2023-10-21 | End: 2023-10-22 | Stop reason: HOSPADM

## 2023-10-21 RX ORDER — IBUPROFEN 600 MG/1
600 TABLET ORAL EVERY 8 HOURS SCHEDULED
Status: DISCONTINUED | OUTPATIENT
Start: 2023-10-21 | End: 2023-10-22 | Stop reason: HOSPADM

## 2023-10-21 RX ADMIN — HYDRALAZINE HYDROCHLORIDE 10 MG: 20 INJECTION INTRAMUSCULAR; INTRAVENOUS at 14:28

## 2023-10-21 RX ADMIN — IBUPROFEN 600 MG: 600 TABLET, FILM COATED ORAL at 22:49

## 2023-10-21 RX ADMIN — HYDRALAZINE HYDROCHLORIDE 5 MG: 20 INJECTION INTRAMUSCULAR; INTRAVENOUS at 13:59

## 2023-10-21 RX ADMIN — IBUPROFEN 600 MG: 600 TABLET, FILM COATED ORAL at 16:00

## 2023-10-21 RX ADMIN — SODIUM CHLORIDE, POTASSIUM CHLORIDE, SODIUM LACTATE AND CALCIUM CHLORIDE: 600; 310; 30; 20 INJECTION, SOLUTION INTRAVENOUS at 16:06

## 2023-10-21 RX ADMIN — BUTALBITAL, ACETAMINOPHEN, AND CAFFEINE 1 TABLET: 50; 325; 40 TABLET ORAL at 20:53

## 2023-10-21 RX ADMIN — BUTALBITAL, ACETAMINOPHEN, AND CAFFEINE 1 TABLET: 50; 325; 40 TABLET ORAL at 16:56

## 2023-10-21 RX ADMIN — NIFEDIPINE 30 MG: 30 TABLET, FILM COATED, EXTENDED RELEASE ORAL at 16:04

## 2023-10-21 ASSESSMENT — PAIN SCALES - GENERAL
PAINLEVEL_OUTOF10: 4
PAINLEVEL_OUTOF10: 7
PAINLEVEL_OUTOF10: 7

## 2023-10-21 NOTE — ED PROVIDER NOTES
time.      Deep Tendon Reflexes: Reflexes normal.   Psychiatric:         Mood and Affect: Mood normal.         Behavior: Behavior normal.      Comments: Tearful at having to be here. MDM     Amount and/or Complexity of Data Reviewed  Clinical lab tests: ordered    Risk of Complications, Morbidity, and/or Mortality  Presenting problems: high  Diagnostic procedures: high  Management options: high            ED Course as of 10/21/23 2030   Sat Oct 21, 2023   1338 Admit to [facility] for PP HTN check. [DG]   8042 Labs and hydralazine protocol initiated. [DG]   1510 Pt is very anxious but is also wanting to go home. Labs all wnl except AST one point above normal range. Will admit for obs and serial BP. Plan to start PO Nifedipine as well. Will mag if more severe range BP. Plan to recheck labs in AM and if stable and BP stable can d/c home for office f/up. Pt made aware that with another adult present, her  can stay in the hospital with her. Pt agreeable with this plan.  [DG]      ED Course User Index  [DG] Rafal Quinteros MD       Procedures

## 2023-10-21 NOTE — PROGRESS NOTES
1845- TRANSFER - OUT REPORT:    Verbal report give on Santiago Mckeon  being transferred to [address] [place] [address] for routine progression of patient care       Report consisted of patient's Situation, Background, Assessment and   Recommendations(SBAR). Information from the following report(s) Nurse Handoff Report, Index, Intake/Output, and MAR was reviewed with the receiving nurse. Lines:   Peripheral IV 10/21/23 Right Antecubital (Active)        Opportunity for questions and clarification was provided.       Patient transported with:  Registered Nurse

## 2023-10-22 VITALS
DIASTOLIC BLOOD PRESSURE: 85 MMHG | HEART RATE: 68 BPM | SYSTOLIC BLOOD PRESSURE: 126 MMHG | TEMPERATURE: 98.4 F | RESPIRATION RATE: 16 BRPM | OXYGEN SATURATION: 98 %

## 2023-10-22 LAB
ALBUMIN SERPL-MCNC: 2.6 G/DL (ref 3.5–5)
ALBUMIN/GLOB SERPL: 0.7 (ref 1.1–2.2)
ALP SERPL-CCNC: 122 U/L (ref 45–117)
ALT SERPL-CCNC: 35 U/L (ref 12–78)
ANION GAP SERPL CALC-SCNC: 7 MMOL/L (ref 5–15)
AST SERPL-CCNC: 16 U/L (ref 15–37)
BILIRUB SERPL-MCNC: 0.3 MG/DL (ref 0.2–1)
BUN SERPL-MCNC: 9 MG/DL (ref 6–20)
BUN/CREAT SERPL: 15 (ref 12–20)
CALCIUM SERPL-MCNC: 8.3 MG/DL (ref 8.5–10.1)
CHLORIDE SERPL-SCNC: 111 MMOL/L (ref 97–108)
CO2 SERPL-SCNC: 24 MMOL/L (ref 21–32)
CREAT SERPL-MCNC: 0.59 MG/DL (ref 0.55–1.02)
GLOBULIN SER CALC-MCNC: 3.8 G/DL (ref 2–4)
GLUCOSE SERPL-MCNC: 89 MG/DL (ref 65–100)
POTASSIUM SERPL-SCNC: 3.4 MMOL/L (ref 3.5–5.1)
PROT SERPL-MCNC: 6.4 G/DL (ref 6.4–8.2)
SODIUM SERPL-SCNC: 142 MMOL/L (ref 136–145)

## 2023-10-22 PROCEDURE — 6370000000 HC RX 637 (ALT 250 FOR IP): Performed by: OBSTETRICS & GYNECOLOGY

## 2023-10-22 PROCEDURE — 36415 COLL VENOUS BLD VENIPUNCTURE: CPT

## 2023-10-22 PROCEDURE — 80053 COMPREHEN METABOLIC PANEL: CPT

## 2023-10-22 PROCEDURE — G0378 HOSPITAL OBSERVATION PER HR: HCPCS

## 2023-10-22 RX ORDER — NIFEDIPINE 30 MG/1
30 TABLET, EXTENDED RELEASE ORAL DAILY
Qty: 30 TABLET | Refills: 3 | Status: SHIPPED | OUTPATIENT
Start: 2023-10-23

## 2023-10-22 RX ORDER — LANOLIN ALCOHOL/MO/W.PET/CERES
400 CREAM (GRAM) TOPICAL DAILY
Qty: 30 TABLET | Refills: 1 | Status: SHIPPED | OUTPATIENT
Start: 2023-10-23

## 2023-10-22 RX ORDER — CYCLOBENZAPRINE HCL 10 MG
10 TABLET ORAL 3 TIMES DAILY PRN
Qty: 30 TABLET | Refills: 1 | Status: SHIPPED | OUTPATIENT
Start: 2023-10-22 | End: 2023-11-11

## 2023-10-22 RX ORDER — LANOLIN ALCOHOL/MO/W.PET/CERES
400 CREAM (GRAM) TOPICAL DAILY
Status: DISCONTINUED | OUTPATIENT
Start: 2023-10-22 | End: 2023-10-22 | Stop reason: HOSPADM

## 2023-10-22 RX ORDER — BUTALBITAL, ACETAMINOPHEN AND CAFFEINE 50; 325; 40 MG/1; MG/1; MG/1
1 TABLET ORAL EVERY 4 HOURS PRN
Qty: 30 TABLET | Refills: 1 | Status: SHIPPED | OUTPATIENT
Start: 2023-10-22

## 2023-10-22 RX ORDER — CYCLOBENZAPRINE HCL 10 MG
10 TABLET ORAL 3 TIMES DAILY PRN
Status: DISCONTINUED | OUTPATIENT
Start: 2023-10-22 | End: 2023-10-22 | Stop reason: HOSPADM

## 2023-10-22 RX ADMIN — IBUPROFEN 600 MG: 600 TABLET, FILM COATED ORAL at 06:03

## 2023-10-22 RX ADMIN — BUTALBITAL, ACETAMINOPHEN, AND CAFFEINE 1 TABLET: 50; 325; 40 TABLET ORAL at 06:03

## 2023-10-22 RX ADMIN — BUTALBITAL, ACETAMINOPHEN, AND CAFFEINE 1 TABLET: 50; 325; 40 TABLET ORAL at 12:20

## 2023-10-22 RX ADMIN — CYCLOBENZAPRINE 10 MG: 10 TABLET, FILM COATED ORAL at 10:13

## 2023-10-22 RX ADMIN — Medication 400 MG: at 10:13

## 2023-10-22 RX ADMIN — IBUPROFEN 600 MG: 600 TABLET, FILM COATED ORAL at 14:21

## 2023-10-22 RX ADMIN — NIFEDIPINE 30 MG: 30 TABLET, FILM COATED, EXTENDED RELEASE ORAL at 09:22

## 2023-10-22 ASSESSMENT — PAIN SCALES - GENERAL
PAINLEVEL_OUTOF10: 4
PAINLEVEL_OUTOF10: 6

## 2023-10-22 ASSESSMENT — PAIN DESCRIPTION - LOCATION
LOCATION: HEAD
LOCATION: HEAD

## 2023-10-22 ASSESSMENT — PAIN - FUNCTIONAL ASSESSMENT: PAIN_FUNCTIONAL_ASSESSMENT: ACTIVITIES ARE NOT PREVENTED

## 2023-10-22 ASSESSMENT — PAIN DESCRIPTION - DESCRIPTORS: DESCRIPTORS: ACHING;THROBBING

## 2023-10-22 ASSESSMENT — PAIN DESCRIPTION - ORIENTATION: ORIENTATION: ANTERIOR;POSTERIOR

## 2023-10-22 NOTE — DISCHARGE INSTRUCTIONS
Learning About Preeclampsia After Childbirth  What is preeclampsia? Preeclampsia is high blood pressure and signs of organ damage, such as protein in the urine, usually after 20 weeks of pregnancy. If it's not treated, preeclampsia can harm you or your baby. Severe preeclampsia can lead to dangerous seizures (eclampsia). When preeclampsia affects the liver, it can cause HELLP syndrome, a blood-clotting and bleeding problem. HELLP can come on quickly and can be dangerous. This is why your doctor checks you and your baby often. Preeclampsia usually goes away after the baby is born. But symptoms may last or get worse after delivery. In rare cases, symptoms may not show up until days or even weeks after childbirth. What are the symptoms? Mild preeclampsia usually doesn't cause symptoms. But it may cause rapid weight gain and sudden swelling of the hands and face. Severe preeclampsia can cause symptoms such as a severe headache, vision problems, and trouble breathing. It also can cause belly pain. And you may urinate less than usual.  What can you expect after you've had preeclampsia? In the hospital  After the baby and the placenta are delivered, preeclampsia usually starts to get better. Most people get better in the first few days after childbirth. After having preeclampsia, you still have a risk of seizures for a day or more after childbirth. (In very rare cases, seizures happen later on.) So your doctor may have you take magnesium sulfate for a day or more to prevent seizures. You may also take medicine to lower your blood pressure. When you go home  Your blood pressure will most likely return to normal a few days after delivery. Your doctor will want to check your blood pressure sometime in the first week after you leave the hospital.  High blood pressure sometimes continues after childbirth. But it usually returns to normal levels with time.   Take and record your blood pressure at home if your

## 2023-10-22 NOTE — H&P
Pt admitted from [facility] for observation due to h/o PP preE and now with PP preE vs PP GHTN on PPD 6. Pt required IV hydralazine x 2, labs wnl except mildly elevated AST by 1 point. --Pt is very anxious. Opted to admit and obs and repeat labs in AM.  --Low threshold for mag but no neuro symptoms and able to control BP.    --Started on procardia 30 mg XL. Fabio Olivera is a [id number] WF PPD 6 uncomplicated  who presents to [facility] with CC of elevated BP and HA. Pt has been monitoring her BP as she has had PP preE with her 1st and 2nd deliveries. Interestingly both boys, but no PP preE with her 3rd child, a girl. Her most recent delivery is another son. She reports having PP preE encephalopathy with the first and then got magnesium for PP preE with the second. She has been having a mild HA since day of delivery with normal BP until slightly worse HA today and elevated BP. She does state that she has had a lot of factors to contribute to her HA including not eating much today,  and difficulty breastfeeding. She is basically formula feeding now. She reports some intermittent blurry vision, no scotoma. She has some SOB at rest and with activity. No CP, no RUQ pain, no significant edema. Headache  Associated symptoms include headaches. Hypertension  Associated symptoms include headaches.              Chief Complaint   Patient presents with    Headache    Hypertension         Past Medical History        Past Medical History:   Diagnosis Date    Abnormal Papanicolaou smear of cervix       colpo normal    Anemia      Asthma      Complication of anesthesia       \"low tolerance\"    Miscarriage      PCOS (polycystic ovarian syndrome)      Polycystic disease, ovaries       questionable    Postpartum hypertension 2015     also 2017    Psychiatric problem       depression/ anxiety, no meds    Thyroid activity decreased       on Synthroid            Past Surgical History         Past Surgical History:

## 2023-10-22 NOTE — DISCHARGE SUMMARY
Patient ID:  Aristides Zheng  [id number]  32 y.o.  1990    Admit Date: 10/21/2023    Discharge Date: 10/22/2023     Admitting Physician: Gabbi Sharp MD  Attending Physician: Houston Dixon DO    Admission Diagnoses: Hypertension in pregnancy, preeclampsia, severe, postpartum condition [O14.15]    Discharge Diagnoses: same  Information for the patient's :  Ma Amira [[id number]]           Additional Diagnoses: Foye Rafael Maternal Labs: No components found for: \"OBEXTABORH\", \"OBEXTABSCRN\", \"OBEXTHBSAG\", \"OBEXTHIV\", \"OBEXTRUBELLA\", \"OBEXTRPR\", \"OBEXTGRBS\"    Cord Blood Results:   Information for the patient's :  Elio Collier [[id number]]     Lab Results   Component Value Date/Time    ABORH A POSITIVE 10/16/2023 09:18 AM    BILI IF DIRECT KRYSTAL POSITIVE, BILIRUBIN TO FOLLOW 10/16/2023 09:18 AM            History of Present Illness:   OB History          8    Para   1    Term   4            AB   4    Living   4         SAB        IAB        Ectopic        Molar        Multiple   0    Live Births   1              Admitted for postpartum ghtn vs preeclampsia. Hospital Course:   Patient was admitted and started on procardia 30. Had mild headache since delivery. One lft abnormal on admission but resolved next day and bps quickly normalized    Follow-up:  She was instructed to follow-up with her obstetrician this week.     Results of Postpartum Depression Screening:  EPDS             Signed:  Lora Farmer MD  10/22/2023  12:37 PM

## 2023-10-22 NOTE — PROGRESS NOTES
Bedside and Verbal shift change report given to LEYDI Jenkins RN (oncoming nurse) by Maurice Bateman RN (offgoing nurse). Report included the following information Nurse Handoff Report.

## 2023-11-27 DIAGNOSIS — E05.00 GRAVES DISEASE: Primary | ICD-10-CM

## 2023-12-02 LAB
T4 FREE SERPL-MCNC: 1.21 NG/DL (ref 0.82–1.77)
TSH SERPL DL<=0.005 MIU/L-ACNC: 0.66 UIU/ML (ref 0.45–4.5)

## 2023-12-29 NOTE — ROUTINE PROCESS
0730: Bedside shift change report given to ROSA Eckert RN (oncoming nurse) by SAADIA Urena RN (offgoing nurse). Report included the following information SBAR.  
9255: Discharge instructions reviewed with pt and all questions answered. Follow up in 1 week with Dr. Kathrine Balderas. Pt to call when ready for wheelchair. 1915: Pt discharged in wheelchair.
1350: TRANSFER - IN REPORT: 
 
Verbal report received from [address] (name) on Remedios Sigala  being received from L&D (unit) for routine progression of care Report consisted of patients Situation, Background, Assessment and  
Recommendations(SBAR). Information from the following report(s) SBAR was reviewed with the receiving nurse. Opportunity for questions and clarification was provided. Assessment completed upon patients arrival to unit and care assumed. 1425: Pt assisted to bathroom and voided. [place] care completed and pt assisted back to bed. Pt tolerated well, denies dizziness. Check void complete. 1605: Pt assisted to bathroom and voided. [place] care completed and pt assisted back to bed. Pt tolerated well, denies dizziness.
Bedside shift change report given to [facility] (oncoming nurse) by Regine Mensah RN (offgoing nurse). Report included the following information SBAR, Kardex, Intake/Output and MAR.
Belly breathing and intercostal retractions noted. Wheezing noted bilaterally
Patient sleeping in stretcher with parents at bedside. Intermittent wheezes noted, no belly breathing or retractions. Vital signs as noted. Patient on continuos pulse ox. Nonverbal indicators of pain absent. Safety measures maintained. Comfort measures applied, call bell within reach. Assessment ongoing
pt awake and alert with family at bedside. treatmentsgiven, last txwas 135. will reassess. pt in no signs of distress. vswnl. safety measures maintained. call bell in erach.

## 2024-01-02 DIAGNOSIS — E05.00 GRAVES DISEASE: ICD-10-CM

## 2024-01-15 ENCOUNTER — TELEMEDICINE (OUTPATIENT)
Age: 34
End: 2024-01-15
Payer: COMMERCIAL

## 2024-01-15 DIAGNOSIS — E05.00 GRAVES DISEASE: Primary | ICD-10-CM

## 2024-01-15 PROCEDURE — 99214 OFFICE O/P EST MOD 30 MIN: CPT | Performed by: INTERNAL MEDICINE

## 2024-01-15 RX ORDER — LEVOTHYROXINE SODIUM 0.05 MG/1
50 TABLET ORAL
COMMUNITY
Start: 2023-12-17

## 2024-01-15 NOTE — PATIENT INSTRUCTIONS
1) TSH is a thyroid test.  Your level is 0.43 which is normal and at goal of 0.45-2.0.  The TSH goes opposite of your thyroid dose and suggests your dose of levothyroxine 6 tabs/week is perfect so I will keep your dose the same.    2) Please come for a follow up visit on 7/16/24 at 1:30pm in our [place] office.    3) I put an order directly into the [employer] system to repeat your labs in the 1-2 weeks prior to your next visit so just ask for the order under my name and make a note on your calendar to have these done at that time.  This order was also put directly into the [employer] portal.  Go under menu and my record and scroll down to upcoming tests and procedures and you are welcome to print this off or bring a copy of the order using the [employer] rajani on your phone to the lab. Thanks!    4) If you feel you need your labs drawn prior to the next visit just let me know.

## 2024-01-15 NOTE — PROGRESS NOTES
Patient identified with two identification factors, Name and Date of Birth.    No chief complaint on file.      There were no vitals taken for this visit.      1. \"Have you been to the ER, urgent care clinic since your last visit?  Hospitalized since your last visit?\" Yes. January 2024 [facility] palpitations     2. \"Have you seen or consulted any other health care providers outside of the [facility] since your last visit?\" [facility]

## 2024-01-15 NOTE — PROGRESS NOTES
Chief Complaint   Patient presents with    Follow-up     Graves Disease       **THIS IS A VIRTUAL VISIT VIA A VIDEO SYNCHRONOUS DISCUSSION. PATIENT AGREED TO HAVE THEIR CARE DELIVERED OVER A [employer] VIDEO VISIT IN PLACE OF THEIR REGULARLY SCHEDULED OFFICE VISIT**    History of Present Illness: Rl Patricio is a 33 y.o. female here for follow up of thyroid.  Delivered her son, Brittany, vaginally on 10/16/23.  Had post-partum pre-eclampsia after delivery and was initially put on nifedipine.  [facility] and was found to have PVCs and a normal ECHO and was told to stop the nifedipine and begin metoprolol.  She was given 25 mg to take 1 tab bid but this was causing her pulse to go into the 40s and she spoke with Dr. Macario over the phone and states she was told it's fine to take 1/2 tab bid and has been doing this the past week or so and her pulse is in the 50s at this time.  Has been taking the levothyroxine 1 tab 6 days a week since delivery.  She currently has the flu and this is doing virtual.    Current Outpatient Medications   Medication Sig    metoprolol tartrate (LOPRESSOR) 25 MG tablet Take 0.5 tablets by mouth 2 times daily    levothyroxine (SYNTHROID) 50 MCG tablet Take 1 tablet by mouth every morning (before breakfast) MON-SAT AND NONE ON SUN--Dose change 10/6/23--updated med list--did not send prescription to the pharmacy    ibuprofen (ADVIL;MOTRIN) 800 MG tablet Take 1 tablet by mouth every 8 hours as needed for Pain    Prenatal Multivit-Min-Fe-FA (PRENATAL/IRON PO) Take by mouth daily     No current facility-administered medications for this visit.     Allergies   Allergen Reactions    Penicillins      Other reaction(s): Unknown (comments)    Sulfa Antibiotics Rash     A.s child       Review of Systems: PER HPI    Physical Examination:  - GENERAL: NCAT, Appears well nourished   - EYES: EOMI, non-icteric, no proptosis   - Ear/Nose/Throat: NCAT, no visible inflammation or masses   -

## 2024-02-26 DIAGNOSIS — J02.0 ACUTE STREPTOCOCCAL PHARYNGITIS: Primary | ICD-10-CM

## 2024-02-26 RX ORDER — AZITHROMYCIN 250 MG/1
TABLET, FILM COATED ORAL
Qty: 6 TABLET | Refills: 0 | Status: SHIPPED | OUTPATIENT
Start: 2024-02-26 | End: 2024-03-07

## 2024-03-14 RX ORDER — LEVOTHYROXINE SODIUM 0.05 MG/1
TABLET ORAL
Qty: 90 TABLET | Refills: 3 | Status: SHIPPED | OUTPATIENT
Start: 2024-03-14

## 2024-07-02 DIAGNOSIS — E05.00 GRAVES DISEASE: ICD-10-CM

## 2024-07-12 LAB
T4 FREE SERPL-MCNC: 1.36 NG/DL (ref 0.82–1.77)
TSH SERPL DL<=0.005 MIU/L-ACNC: 0.44 UIU/ML (ref 0.45–4.5)

## 2024-07-13 LAB
THYROGLOB AB SERPL-ACNC: <1 IU/ML (ref 0–0.9)
THYROPEROXIDASE AB SERPL-ACNC: 276 IU/ML (ref 0–34)

## 2024-07-16 ENCOUNTER — OFFICE VISIT (OUTPATIENT)
Age: 34
End: 2024-07-16
Payer: COMMERCIAL

## 2024-07-16 VITALS
HEART RATE: 69 BPM | HEIGHT: 60 IN | BODY MASS INDEX: 24.66 KG/M2 | SYSTOLIC BLOOD PRESSURE: 125 MMHG | DIASTOLIC BLOOD PRESSURE: 78 MMHG | WEIGHT: 125.6 LBS

## 2024-07-16 DIAGNOSIS — E05.00 GRAVES DISEASE: Primary | ICD-10-CM

## 2024-07-16 PROCEDURE — 99214 OFFICE O/P EST MOD 30 MIN: CPT | Performed by: INTERNAL MEDICINE

## 2024-07-16 RX ORDER — LEVOTHYROXINE SODIUM 50 UG/1
50 TABLET ORAL DAILY
Qty: 90 TABLET | Refills: 3 | Status: SHIPPED | OUTPATIENT
Start: 2024-07-16

## 2024-07-16 RX ORDER — ELECTROLYTES/DEXTROSE
SOLUTION, ORAL ORAL DAILY
COMMUNITY

## 2024-07-16 RX ORDER — LEVOTHYROXINE SODIUM 0.05 MG/1
TABLET ORAL
Qty: 90 TABLET | Refills: 3 | Status: SHIPPED | OUTPATIENT
Start: 2024-07-16 | End: 2024-07-16 | Stop reason: ALTCHOICE

## 2024-07-16 NOTE — PROGRESS NOTES
Chief Complaint   Patient presents with    Thyroid Problem     PCP and pharmacy confirmed     History of Present Illness: Rl Patricio is a 33 y.o. female here for follow up of thyroid.  Weight down 11 lbs since last visit in person in 6/23.  Since last visit in 1/24, she contacted me in 5/24 that her OB jack her labs and since her TSH was high, we increased her levothyroxine from 6 tabs/week to 7 tabs/week.  May have missed one dose at most and didn't double up for this.  Does take a mvi at night but may miss this a few days a week.  Gets her levothyroxine every morning at least 30 min before food and coffee.  Has noticed that she is feeling a little warm the longer she has taken the higher dose.  Bowels are a little more constipated but not too bad.  No hair loss more than normal.  No chest pain or palpitations.  Has remained on 1/2 tab bid of metoprolol and this tends to keep her HR in the 50-60s.  Some headaches and trouble sleeping.  Unclear if she could be more dehydrated.  Her son, Brittany, is 9 months today.  Her daughter Danielle is with her today and is 4 and also has an 8 and 8 yo son.  She does not plan on having any other biological children as her  will be getting a vasectomy but may consider adoption in the future.  Would like to see how her thyroid levels settle out over time rather than consider thyroidectomy for her fluctuating levels and I feel this is a reasonable plan.      Current Outpatient Medications   Medication Sig    levothyroxine (SYNTHROID) 50 MCG tablet Take 1 tab daily--Dose change 05/24/24--updated med list--did not send prescription to the pharmacy    metoprolol tartrate (LOPRESSOR) 25 MG tablet Take 0.5 tablets by mouth 2 times daily     No current facility-administered medications for this visit.     Allergies   Allergen Reactions    Penicillins      Other reaction(s): Unknown (comments)    Sulfa Antibiotics Rash     A.s child       Review of Systems: PER HPI    Physical

## 2024-07-16 NOTE — PATIENT INSTRUCTIONS
1) TSH is a thyroid test.  Your level is 0.44 which is just barely low and below goal of 0.45-2.0.  The TSH goes opposite of your thyroid dose and suggests your dose of levothyroxine is likely adequate but because of the variability with generic, sometimes your level will fluctuate and you will feel better with brand medication.      I sent brand levoxyl 50 mcg tabs to still take 1 tab 7 days a week to your pharmacy.    2) Please repeat your labs in 6-8 weeks.   I will send you a message through [employer] with your lab results.

## 2024-08-20 DIAGNOSIS — J01.00 SUBACUTE MAXILLARY SINUSITIS: Primary | ICD-10-CM

## 2024-08-20 RX ORDER — AZITHROMYCIN 250 MG/1
TABLET, FILM COATED ORAL
Qty: 6 TABLET | Refills: 0 | Status: SHIPPED | OUTPATIENT
Start: 2024-08-20 | End: 2024-08-30

## 2024-08-20 RX ORDER — GUAIFENESIN AND DEXTROMETHORPHAN HYDROBROMIDE 600; 30 MG/1; MG/1
1 TABLET, EXTENDED RELEASE ORAL 2 TIMES DAILY PRN
Qty: 20 TABLET | Refills: 2 | Status: SHIPPED | OUTPATIENT
Start: 2024-08-20

## 2024-08-27 DIAGNOSIS — E05.00 GRAVES DISEASE: ICD-10-CM

## 2024-09-27 LAB
T4 FREE SERPL-MCNC: 1.37 NG/DL (ref 0.82–1.77)
TSH SERPL DL<=0.005 MIU/L-ACNC: 1.69 UIU/ML (ref 0.45–4.5)

## 2024-10-30 DIAGNOSIS — J02.0 STREP THROAT: Primary | ICD-10-CM

## 2024-10-30 RX ORDER — AZITHROMYCIN 250 MG/1
TABLET, FILM COATED ORAL
Qty: 6 TABLET | Refills: 0 | Status: SHIPPED | OUTPATIENT
Start: 2024-10-30 | End: 2024-11-09

## 2025-01-05 DIAGNOSIS — E05.00 GRAVES DISEASE: ICD-10-CM

## 2025-01-16 LAB
T4 FREE SERPL-MCNC: 1.22 NG/DL (ref 0.82–1.77)
THYROPEROXIDASE AB SERPL-ACNC: 96 IU/ML (ref 0–34)
TSH SERPL DL<=0.005 MIU/L-ACNC: 3.3 UIU/ML (ref 0.45–4.5)

## 2025-01-20 ENCOUNTER — OFFICE VISIT (OUTPATIENT)
Age: 35
End: 2025-01-20
Payer: COMMERCIAL

## 2025-01-20 VITALS
HEIGHT: 60 IN | BODY MASS INDEX: 23.01 KG/M2 | SYSTOLIC BLOOD PRESSURE: 114 MMHG | DIASTOLIC BLOOD PRESSURE: 70 MMHG | WEIGHT: 117.2 LBS | HEART RATE: 65 BPM

## 2025-01-20 DIAGNOSIS — E05.00 GRAVES DISEASE: Primary | ICD-10-CM

## 2025-01-20 PROCEDURE — 99214 OFFICE O/P EST MOD 30 MIN: CPT | Performed by: INTERNAL MEDICINE

## 2025-01-20 RX ORDER — LEVOTHYROXINE SODIUM 50 UG/1
50 TABLET ORAL DAILY
Qty: 90 TABLET | Refills: 3 | Status: SHIPPED | OUTPATIENT
Start: 2025-01-20

## 2025-01-20 NOTE — PROGRESS NOTES
of the antibody target that are causing her to flip between these 2 thyroid states and she wants to hold on this for now especially since most of this fluctuation occurred around the time of pregnancy or post-partum and now does not plan on having further children.  - increase levoxyl 50 mcg to 6 tabs/week  - check TSH and free T4 in 6 weeks and prior to next visit  - check TPO ab prior to next visit            Patient Instructions   1) TSH is a thyroid test.  Your level is 3.3 which is normal but above goal of 0.45-2.0.  The TSH goes opposite of your thyroid dose and suggests your dose of levoxyl is not quite enough.  I will increase your dose to 1 tab on Mon-Sat and skip Sun.  I updated your med list but did not send a new prescription to your pharmacy on file that has been filling this med.    2) Please repeat your labs in 6-8 weeks.   I will send you a message through [employer] with your lab results.    3) Your TPO ab has improved since last visit.      Return in about 6 months (around 7/20/2025).     Copy sent to:  Diego Macario MD Dr. Kenley Neuman      The patient (or guardian, if applicable) and other individuals in attendance with the patient were advised that Artificial Intelligence will be utilized during this visit to record, process the conversation to generate a clinical note, and support improvement of the AI technology. The patient (or guardian, if applicable) and other individuals in attendance at the appointment consented to the use of AI, including the recording.

## 2025-01-20 NOTE — PATIENT INSTRUCTIONS
1) TSH is a thyroid test.  Your level is 3.3 which is normal but above goal of 0.45-2.0.  The TSH goes opposite of your thyroid dose and suggests your dose of levoxyl is not quite enough.  I will increase your dose to 1 tab on Mon-Sat and skip Sun.  I updated your med list but did not send a new prescription to your pharmacy on file that has been filling this med.    2) Please repeat your labs in 6-8 weeks.   I will send you a message through [employer] with your lab results.    3) Your TPO ab has improved since last visit.

## 2025-03-03 DIAGNOSIS — E05.00 GRAVES DISEASE: ICD-10-CM

## 2025-03-25 LAB
T4 FREE SERPL-MCNC: 1.22 NG/DL (ref 0.82–1.77)
TSH SERPL DL<=0.005 MIU/L-ACNC: 3.6 UIU/ML (ref 0.45–4.5)

## 2025-03-26 ENCOUNTER — RESULTS FOLLOW-UP (OUTPATIENT)
Age: 35
End: 2025-03-26

## 2025-04-15 DIAGNOSIS — J02.0 ACUTE STREPTOCOCCAL PHARYNGITIS: Primary | ICD-10-CM

## 2025-04-15 RX ORDER — AZITHROMYCIN 250 MG/1
TABLET, FILM COATED ORAL
Qty: 6 TABLET | Refills: 0 | Status: SHIPPED | OUTPATIENT
Start: 2025-04-15 | End: 2025-04-25

## 2025-07-06 DIAGNOSIS — E05.00 GRAVES DISEASE: ICD-10-CM

## 2025-07-08 LAB
T4 FREE SERPL-MCNC: 1.27 NG/DL (ref 0.82–1.77)
THYROPEROXIDASE AB SERPL-ACNC: 132 IU/ML (ref 0–34)
TSH SERPL DL<=0.005 MIU/L-ACNC: 5.47 UIU/ML (ref 0.45–4.5)

## 2025-07-11 ENCOUNTER — TELEMEDICINE (OUTPATIENT)
Age: 35
End: 2025-07-11
Payer: COMMERCIAL

## 2025-07-11 DIAGNOSIS — E05.00 GRAVES DISEASE: Primary | ICD-10-CM

## 2025-07-11 PROCEDURE — 99214 OFFICE O/P EST MOD 30 MIN: CPT | Performed by: INTERNAL MEDICINE

## 2025-07-11 RX ORDER — LEVOTHYROXINE SODIUM 50 UG/1
TABLET ORAL
Qty: 120 TABLET | Refills: 3 | Status: SHIPPED | OUTPATIENT
Start: 2025-07-11

## 2025-07-11 NOTE — PROGRESS NOTES
Chief Complaint   Patient presents with    Thyroid Problem    Follow-up    Other     PCP and Pharmacy verified   Patient consents to [facility]         **THIS IS A VIRTUAL VISIT VIA A VIDEO SYNCHRONOUS DISCUSSION. PATIENT AGREED TO HAVE THEIR CARE DELIVERED OVER A [employer] VIDEO VISIT IN PLACE OF THEIR REGULARLY SCHEDULED OFFICE VISIT**    History of Present Illness: Rl Patricio is a 34 y.o. female here for follow up of thyroid.    History of Present Illness  The patient is a 34-year-old female who presents via virtual visit for follow-up of Graves' disease.    She has been adhering to her daily Levoxyl regimen, with no missed doses. However, due to recent travel, there were instances where the medication was taken later in the day. She is uncertain if she has been taking it with food more frequently than recommended. She takes her multivitamin at night before bed and Levoxyl in the morning upon waking.     She has been experiencing symptoms of hypothyroidism for several weeks, including feeling cold, fatigue, brain fog, forgetfulness, and an unusual sensation in her head. She also reports dry skin and slightly slower bowel movements. Her weight has remained stable, around 117 to 120 pounds. She does not take any acid reflux medications or birth control pills.      Current Outpatient Medications   Medication Sig    LEVOXYL 50 MCG tablet Take 1 tablet by mouth Daily BRAND MEDICALLY NECESSARY--Dose change 03/26/25--updated med list--did not send prescription to the pharmacy    Multiple Vitamin (MULTIVITAMIN ADULT) TABS Take by mouth daily    metoprolol tartrate (LOPRESSOR) 25 MG tablet Take 0.5 tablets by mouth 2 times daily     No current facility-administered medications for this visit.     Allergies   Allergen Reactions    Penicillins      Other reaction(s): Unknown (comments)    Sulfa Antibiotics Rash     A.s child       Review of Systems: PER HPI    Physical Examination:  - GENERAL: NCAT, Appears well nourished   -

## 2025-07-11 NOTE — PATIENT INSTRUCTIONS
1) TSH is a thyroid test.  Your level is 5.4 which is high and above goal of 0.45-2.0.  The TSH goes opposite of your thyroid dose and suggests your dose of levoxyl is not enough.  I will increase your dose to 1 tab on Mon-Fri and 2 tabs on Sat and Sun and have sent a new prescription to your local pharmacy.    2) Your TPO ab is higher than last time.    3) I put an order directly into the [employer] system to repeat your labs in 6-8 weeks and in the 1-2 weeks prior to your next visit so just ask for the order under my name and make a note on your calendar to have these done at these times. These orders were also put directly into the [employer] portal.  Go under menu and my record and scroll down to upcoming tests and procedures and you are welcome to print these off or bring a copy of the orders using the [employer] rajani on your phone to the lab. Thanks!      4) Please come for a follow up visit on 1/22/26 at 11:30am in our [place] office.

## 2025-08-22 DIAGNOSIS — E05.00 GRAVES DISEASE: ICD-10-CM

## 2025-08-27 DIAGNOSIS — J02.0 STREP THROAT: Primary | ICD-10-CM

## 2025-08-27 RX ORDER — AZITHROMYCIN 250 MG/1
TABLET, FILM COATED ORAL
Qty: 6 TABLET | Refills: 0 | Status: SHIPPED | OUTPATIENT
Start: 2025-08-27 | End: 2025-09-06